# Patient Record
Sex: MALE | Race: WHITE | Employment: OTHER | URBAN - METROPOLITAN AREA
[De-identification: names, ages, dates, MRNs, and addresses within clinical notes are randomized per-mention and may not be internally consistent; named-entity substitution may affect disease eponyms.]

---

## 2017-03-29 ENCOUNTER — TELEPHONE (OUTPATIENT)
Dept: FAMILY MEDICINE CLINIC | Age: 82
End: 2017-03-29

## 2017-03-31 ENCOUNTER — OFFICE VISIT (OUTPATIENT)
Dept: FAMILY MEDICINE CLINIC | Age: 82
End: 2017-03-31

## 2017-03-31 ENCOUNTER — TELEPHONE (OUTPATIENT)
Dept: FAMILY MEDICINE CLINIC | Age: 82
End: 2017-03-31

## 2017-03-31 VITALS
HEIGHT: 68 IN | DIASTOLIC BLOOD PRESSURE: 68 MMHG | WEIGHT: 160 LBS | TEMPERATURE: 97.4 F | BODY MASS INDEX: 24.25 KG/M2 | HEART RATE: 64 BPM | SYSTOLIC BLOOD PRESSURE: 122 MMHG | RESPIRATION RATE: 18 BRPM

## 2017-03-31 DIAGNOSIS — H61.23 BILATERAL IMPACTED CERUMEN: ICD-10-CM

## 2017-03-31 DIAGNOSIS — H53.8 BLURRED VISION: Primary | ICD-10-CM

## 2017-03-31 PROCEDURE — 99212 OFFICE O/P EST SF 10 MIN: CPT | Performed by: INTERNAL MEDICINE

## 2017-03-31 ASSESSMENT — PATIENT HEALTH QUESTIONNAIRE - PHQ9
1. LITTLE INTEREST OR PLEASURE IN DOING THINGS: 0
SUM OF ALL RESPONSES TO PHQ QUESTIONS 1-9: 0
SUM OF ALL RESPONSES TO PHQ9 QUESTIONS 1 & 2: 0
2. FEELING DOWN, DEPRESSED OR HOPELESS: 0

## 2017-03-31 ASSESSMENT — ENCOUNTER SYMPTOMS
DIARRHEA: 0
SHORTNESS OF BREATH: 0
CONSTIPATION: 0
WHEEZING: 0

## 2018-05-03 ENCOUNTER — OFFICE VISIT (OUTPATIENT)
Dept: FAMILY MEDICINE CLINIC | Age: 83
End: 2018-05-03

## 2018-05-03 VITALS
BODY MASS INDEX: 24.02 KG/M2 | HEART RATE: 57 BPM | DIASTOLIC BLOOD PRESSURE: 60 MMHG | RESPIRATION RATE: 12 BRPM | WEIGHT: 158 LBS | SYSTOLIC BLOOD PRESSURE: 110 MMHG

## 2018-05-03 DIAGNOSIS — R94.31 ABNORMAL ELECTROCARDIOGRAM (ECG) (EKG): ICD-10-CM

## 2018-05-03 DIAGNOSIS — E78.00 HYPERCHOLESTEROLEMIA: ICD-10-CM

## 2018-05-03 DIAGNOSIS — Z82.49 FAMILY HISTORY OF ABDOMINAL AORTIC ANEURYSM (AAA): ICD-10-CM

## 2018-05-03 DIAGNOSIS — R10.13 EPIGASTRIC PAIN: Primary | ICD-10-CM

## 2018-05-03 PROCEDURE — 99214 OFFICE O/P EST MOD 30 MIN: CPT | Performed by: NURSE PRACTITIONER

## 2018-05-03 PROCEDURE — 93000 ELECTROCARDIOGRAM COMPLETE: CPT | Performed by: NURSE PRACTITIONER

## 2018-05-03 ASSESSMENT — PATIENT HEALTH QUESTIONNAIRE - PHQ9
SUM OF ALL RESPONSES TO PHQ9 QUESTIONS 1 & 2: 0
2. FEELING DOWN, DEPRESSED OR HOPELESS: 0
SUM OF ALL RESPONSES TO PHQ QUESTIONS 1-9: 0
1. LITTLE INTEREST OR PLEASURE IN DOING THINGS: 0

## 2018-05-03 ASSESSMENT — ENCOUNTER SYMPTOMS
NAUSEA: 0
SHORTNESS OF BREATH: 0
ABDOMINAL PAIN: 1
CHEST TIGHTNESS: 0
BLOOD IN STOOL: 0
DIARRHEA: 0
VOMITING: 0
CONSTIPATION: 0
COUGH: 0

## 2018-05-04 DIAGNOSIS — E78.00 HYPERCHOLESTEROLEMIA: ICD-10-CM

## 2018-05-04 DIAGNOSIS — R10.13 EPIGASTRIC PAIN: ICD-10-CM

## 2018-05-04 LAB
A/G RATIO: 2.3 (ref 1.1–2.2)
ALBUMIN SERPL-MCNC: 4.4 G/DL (ref 3.4–5)
ALP BLD-CCNC: 57 U/L (ref 40–129)
ALT SERPL-CCNC: 15 U/L (ref 10–40)
AMYLASE: 69 U/L (ref 25–115)
ANION GAP SERPL CALCULATED.3IONS-SCNC: 10 MMOL/L (ref 3–16)
AST SERPL-CCNC: 15 U/L (ref 15–37)
BASOPHILS ABSOLUTE: 0 K/UL (ref 0–0.2)
BASOPHILS RELATIVE PERCENT: 0.4 %
BILIRUB SERPL-MCNC: 0.7 MG/DL (ref 0–1)
BUN BLDV-MCNC: 15 MG/DL (ref 7–20)
CALCIUM SERPL-MCNC: 8.9 MG/DL (ref 8.3–10.6)
CHLORIDE BLD-SCNC: 107 MMOL/L (ref 99–110)
CHOLESTEROL, TOTAL: 192 MG/DL (ref 0–199)
CO2: 27 MMOL/L (ref 21–32)
CREAT SERPL-MCNC: 0.6 MG/DL (ref 0.8–1.3)
EOSINOPHILS ABSOLUTE: 0.1 K/UL (ref 0–0.6)
EOSINOPHILS RELATIVE PERCENT: 1.5 %
GFR AFRICAN AMERICAN: >60
GFR NON-AFRICAN AMERICAN: >60
GLOBULIN: 1.9 G/DL
GLUCOSE BLD-MCNC: 95 MG/DL (ref 70–99)
HCT VFR BLD CALC: 42.7 % (ref 40.5–52.5)
HDLC SERPL-MCNC: 69 MG/DL (ref 40–60)
HEMOGLOBIN: 14.7 G/DL (ref 13.5–17.5)
LDL CHOLESTEROL CALCULATED: 111 MG/DL
LIPASE: 20 U/L (ref 13–60)
LYMPHOCYTES ABSOLUTE: 1.9 K/UL (ref 1–5.1)
LYMPHOCYTES RELATIVE PERCENT: 36.8 %
MCH RBC QN AUTO: 32.4 PG (ref 26–34)
MCHC RBC AUTO-ENTMCNC: 34.4 G/DL (ref 31–36)
MCV RBC AUTO: 94.1 FL (ref 80–100)
MONOCYTES ABSOLUTE: 0.5 K/UL (ref 0–1.3)
MONOCYTES RELATIVE PERCENT: 10.6 %
NEUTROPHILS ABSOLUTE: 2.6 K/UL (ref 1.7–7.7)
NEUTROPHILS RELATIVE PERCENT: 50.7 %
PDW BLD-RTO: 14.2 % (ref 12.4–15.4)
PLATELET # BLD: 171 K/UL (ref 135–450)
PMV BLD AUTO: 8.9 FL (ref 5–10.5)
POTASSIUM SERPL-SCNC: 4.3 MMOL/L (ref 3.5–5.1)
RBC # BLD: 4.54 M/UL (ref 4.2–5.9)
SODIUM BLD-SCNC: 144 MMOL/L (ref 136–145)
TOTAL PROTEIN: 6.3 G/DL (ref 6.4–8.2)
TRIGL SERPL-MCNC: 59 MG/DL (ref 0–150)
VLDLC SERPL CALC-MCNC: 12 MG/DL
WBC # BLD: 5.1 K/UL (ref 4–11)

## 2018-05-07 ENCOUNTER — TELEPHONE (OUTPATIENT)
Dept: FAMILY MEDICINE CLINIC | Age: 83
End: 2018-05-07

## 2018-05-07 DIAGNOSIS — R10.13 EPIGASTRIC PAIN: ICD-10-CM

## 2018-05-07 DIAGNOSIS — Z82.49 FAMILY HISTORY OF ABDOMINAL AORTIC ANEURYSM (AAA): ICD-10-CM

## 2018-05-07 NOTE — TELEPHONE ENCOUNTER
[address] is calling to see if she can get an order placed for the pt to have a CT of the abdominal and pelvis with contrast.    Please advise    CB# [phone]    Please advise

## 2018-05-09 ENCOUNTER — HOSPITAL ENCOUNTER (OUTPATIENT)
Dept: NON INVASIVE DIAGNOSTICS | Age: 83
Discharge: HOME | End: 2018-05-09
Attending: NURSE PRACTITIONER | Admitting: NURSE PRACTITIONER

## 2018-05-09 ENCOUNTER — TELEPHONE (OUTPATIENT)
Dept: FAMILY MEDICINE CLINIC | Age: 83
End: 2018-05-09

## 2018-05-09 DIAGNOSIS — Z82.49 FAMILY HISTORY OF ABDOMINAL AORTIC ANEURYSM (AAA): ICD-10-CM

## 2018-05-09 DIAGNOSIS — R94.31 ABNORMAL ELECTROCARDIOGRAM: ICD-10-CM

## 2018-05-09 DIAGNOSIS — R10.13 EPIGASTRIC PAIN: ICD-10-CM

## 2018-05-09 PROBLEM — I20.0 UNSTABLE ANGINA (HCC): Status: ACTIVE | Noted: 2018-05-09

## 2018-05-09 LAB
LV EF: 58 %
LVEF MODALITY: NORMAL

## 2018-05-09 RX ADMIN — Medication 325 MG: at 12:04

## 2018-05-11 PROBLEM — I71.40 ABDOMINAL AORTIC ANEURYSM (AAA) WITHOUT RUPTURE: Status: ACTIVE | Noted: 2018-05-11

## 2018-05-21 ENCOUNTER — TELEPHONE (OUTPATIENT)
Dept: FAMILY MEDICINE CLINIC | Age: 83
End: 2018-05-21

## 2018-05-21 DIAGNOSIS — I71.40 ABDOMINAL AORTIC ANEURYSM (AAA) WITHOUT RUPTURE: Primary | ICD-10-CM

## 2018-05-23 ENCOUNTER — OFFICE VISIT (OUTPATIENT)
Dept: CARDIOTHORACIC SURGERY | Age: 83
End: 2018-05-23

## 2018-05-23 VITALS
WEIGHT: 153 LBS | BODY MASS INDEX: 23.26 KG/M2 | HEART RATE: 80 BPM | OXYGEN SATURATION: 99 % | TEMPERATURE: 98 F | DIASTOLIC BLOOD PRESSURE: 60 MMHG | SYSTOLIC BLOOD PRESSURE: 116 MMHG

## 2018-05-23 DIAGNOSIS — I71.40 ABDOMINAL AORTIC ANEURYSM (AAA) WITHOUT RUPTURE: ICD-10-CM

## 2018-05-23 DIAGNOSIS — I20.0 UNSTABLE ANGINA (HCC): ICD-10-CM

## 2018-05-23 DIAGNOSIS — Z95.1 S/P CABG X 2: Primary | ICD-10-CM

## 2018-05-23 PROCEDURE — 99024 POSTOP FOLLOW-UP VISIT: CPT | Performed by: THORACIC SURGERY (CARDIOTHORACIC VASCULAR SURGERY)

## 2018-06-04 ENCOUNTER — INITIAL CONSULT (OUTPATIENT)
Dept: SURGERY | Age: 83
End: 2018-06-04

## 2018-06-04 VITALS
BODY MASS INDEX: 23.27 KG/M2 | SYSTOLIC BLOOD PRESSURE: 138 MMHG | DIASTOLIC BLOOD PRESSURE: 71 MMHG | WEIGHT: 153.04 LBS | HEART RATE: 79 BPM

## 2018-06-04 DIAGNOSIS — I71.40 ABDOMINAL AORTIC ANEURYSM (AAA) WITHOUT RUPTURE: Primary | ICD-10-CM

## 2018-06-04 DIAGNOSIS — I10 HYPERTENSION, UNSPECIFIED TYPE: ICD-10-CM

## 2018-06-04 DIAGNOSIS — Z95.1 S/P CABG X 2: ICD-10-CM

## 2018-06-04 DIAGNOSIS — E78.00 HYPERCHOLESTEROLEMIA: ICD-10-CM

## 2018-06-04 DIAGNOSIS — Z82.49 FAMILY HISTORY OF ABDOMINAL AORTIC ANEURYSM (AAA): ICD-10-CM

## 2018-06-04 PROCEDURE — 99214 OFFICE O/P EST MOD 30 MIN: CPT | Performed by: NURSE PRACTITIONER

## 2018-06-04 ASSESSMENT — ENCOUNTER SYMPTOMS: ABDOMINAL PAIN: 0

## 2018-06-06 ENCOUNTER — TELEPHONE (OUTPATIENT)
Dept: FAMILY MEDICINE CLINIC | Age: 83
End: 2018-06-06

## 2018-06-13 ENCOUNTER — OFFICE VISIT (OUTPATIENT)
Dept: CARDIOLOGY CLINIC | Age: 83
End: 2018-06-13

## 2018-06-13 VITALS
HEART RATE: 75 BPM | BODY MASS INDEX: 23.04 KG/M2 | WEIGHT: 152 LBS | DIASTOLIC BLOOD PRESSURE: 62 MMHG | SYSTOLIC BLOOD PRESSURE: 104 MMHG | OXYGEN SATURATION: 98 % | HEIGHT: 68 IN

## 2018-06-13 DIAGNOSIS — I71.40 ABDOMINAL AORTIC ANEURYSM (AAA) WITHOUT RUPTURE: ICD-10-CM

## 2018-06-13 DIAGNOSIS — I10 ESSENTIAL HYPERTENSION: ICD-10-CM

## 2018-06-13 DIAGNOSIS — Z95.1 S/P CABG X 2: Primary | ICD-10-CM

## 2018-06-13 PROCEDURE — 93000 ELECTROCARDIOGRAM COMPLETE: CPT | Performed by: INTERNAL MEDICINE

## 2018-06-13 PROCEDURE — 99214 OFFICE O/P EST MOD 30 MIN: CPT | Performed by: INTERNAL MEDICINE

## 2018-06-13 RX ORDER — METOPROLOL SUCCINATE 25 MG/1
12.5 TABLET, EXTENDED RELEASE ORAL DAILY
Qty: 90 TABLET | Refills: 3 | Status: SHIPPED | OUTPATIENT
Start: 2018-06-13 | End: 2019-07-11 | Stop reason: SDUPTHER

## 2018-06-13 RX ORDER — ATORVASTATIN CALCIUM 20 MG/1
20 TABLET, FILM COATED ORAL NIGHTLY
Qty: 90 TABLET | Refills: 3 | Status: SHIPPED | OUTPATIENT
Start: 2018-06-13 | End: 2019-03-19 | Stop reason: SDUPTHER

## 2018-06-13 RX ORDER — CLOPIDOGREL BISULFATE 75 MG/1
75 TABLET ORAL DAILY
Qty: 90 TABLET | Refills: 3 | Status: SHIPPED | OUTPATIENT
Start: 2018-06-13 | End: 2019-03-28 | Stop reason: SDUPTHER

## 2018-06-19 ENCOUNTER — OFFICE VISIT (OUTPATIENT)
Dept: FAMILY MEDICINE CLINIC | Age: 83
End: 2018-06-19

## 2018-06-19 VITALS
HEIGHT: 68 IN | DIASTOLIC BLOOD PRESSURE: 78 MMHG | RESPIRATION RATE: 16 BRPM | SYSTOLIC BLOOD PRESSURE: 118 MMHG | BODY MASS INDEX: 22.73 KG/M2 | OXYGEN SATURATION: 96 % | WEIGHT: 150 LBS | HEART RATE: 76 BPM

## 2018-06-19 DIAGNOSIS — D64.9 ANEMIA, UNSPECIFIED TYPE: ICD-10-CM

## 2018-06-19 DIAGNOSIS — I10 HYPERTENSION, UNSPECIFIED TYPE: ICD-10-CM

## 2018-06-19 DIAGNOSIS — H61.22 IMPACTED CERUMEN OF LEFT EAR: ICD-10-CM

## 2018-06-19 DIAGNOSIS — K63.9 COLON WALL THICKENING: ICD-10-CM

## 2018-06-19 DIAGNOSIS — I71.40 ABDOMINAL AORTIC ANEURYSM (AAA) WITHOUT RUPTURE: Primary | ICD-10-CM

## 2018-06-19 DIAGNOSIS — R73.9 HYPERGLYCEMIA: ICD-10-CM

## 2018-06-19 DIAGNOSIS — Z95.1 S/P CABG X 2: ICD-10-CM

## 2018-06-19 DIAGNOSIS — E78.00 HYPERCHOLESTEROLEMIA: ICD-10-CM

## 2018-06-19 PROCEDURE — 99214 OFFICE O/P EST MOD 30 MIN: CPT | Performed by: INTERNAL MEDICINE

## 2018-06-19 ASSESSMENT — ENCOUNTER SYMPTOMS
CONSTIPATION: 0
SHORTNESS OF BREATH: 0
BLOOD IN STOOL: 0
WHEEZING: 0
ABDOMINAL PAIN: 0
DIARRHEA: 0

## 2018-06-19 ASSESSMENT — PATIENT HEALTH QUESTIONNAIRE - PHQ9
SUM OF ALL RESPONSES TO PHQ9 QUESTIONS 1 & 2: 0
SUM OF ALL RESPONSES TO PHQ QUESTIONS 1-9: 0
2. FEELING DOWN, DEPRESSED OR HOPELESS: 0
1. LITTLE INTEREST OR PLEASURE IN DOING THINGS: 0

## 2018-06-20 ENCOUNTER — OFFICE VISIT (OUTPATIENT)
Dept: CARDIOTHORACIC SURGERY | Age: 83
End: 2018-06-20

## 2018-06-20 VITALS
WEIGHT: 150 LBS | HEIGHT: 68 IN | OXYGEN SATURATION: 96 % | DIASTOLIC BLOOD PRESSURE: 76 MMHG | HEART RATE: 71 BPM | BODY MASS INDEX: 22.73 KG/M2 | TEMPERATURE: 97.5 F | SYSTOLIC BLOOD PRESSURE: 130 MMHG

## 2018-06-20 DIAGNOSIS — I71.40 ABDOMINAL AORTIC ANEURYSM (AAA) WITHOUT RUPTURE: ICD-10-CM

## 2018-06-20 DIAGNOSIS — I20.0 UNSTABLE ANGINA (HCC): Primary | ICD-10-CM

## 2018-06-20 DIAGNOSIS — Z95.1 S/P CABG X 2: ICD-10-CM

## 2018-06-20 PROCEDURE — 99024 POSTOP FOLLOW-UP VISIT: CPT | Performed by: THORACIC SURGERY (CARDIOTHORACIC VASCULAR SURGERY)

## 2018-06-25 ENCOUNTER — HOSPITAL ENCOUNTER (OUTPATIENT)
Dept: OTHER | Age: 83
Discharge: HOME | End: 2018-06-30
Attending: INTERNAL MEDICINE | Admitting: INTERNAL MEDICINE

## 2018-06-25 NOTE — PROGRESS NOTES
Ejection fraction -systolic function intact according to cath report, no ECHO results in record       Physical Assessment     General Appearance   Color: [x]  Natural [] Pale ( ) Cyanotic []  Flushed [] Jaundice   Skin Integrity: healing surgical incisions   Incision(s) where: midsternal and right lower leg surgical incision  Hx of infection at incision(s) site [x] No  [] Yes      Cardiovascular Assessment/ Vital Signs    Heart rate: 74  Heart sounds: nl S1S2  Height: 5'8\"  Weight: 154.3 lbs    Resp rate: 16  Lungs sounds: fairly CTA     Dyspnea  []  no  [x] yes -mainly on stairs      []  Sleep Apnea    []  CPAP     []  Oxygen       Sleeping Habits: averages 7-8 hours of sleep per night       # of Pillows: 1    # of times up at night: 1-2 times for restroom use    BP  R arm sittin/68   L arm sittin/64    Peripheral pulses  []  no [x] yes    Edema [x] no [] yes  Location:      Fatigue  [x] no  [] yes    Numbness/tingling [x]  no   []  yes       Orthopedic/Exercise Limitations: denies limitations    Pain Assessment   Rate on 1 to 10 scale      [x]  No complaints of pain at this time                    [] Angina/chest pain Rates:    Relief with:       []  Incisional Pain Rates:      Relief with:        []  Muscle / Joint / Arthritic    Rates:     Relief with:         []  Leg Pain     Rates:    Relief with:         []  Other        Fall Risk Assessment: Falls in the last 3 months  [] yes [x]  no     []  Alzheimers Disease [] Cognitive Impairment      [] Balance/Gait Disturbance  []  Fall History      []  Visual impairment uncorrected []  Dizziness []  Uses a cane or walker    []  Other     [x]  Fall safety issues reviewed    Physical/behavioral signs of abuse/neglect  [x] no    []  yes      Do you feel safe at home   []  no    [x]  yes    Advanced Directives        [] no   [x]  yes   []  Pt given Advanced Directive pack            Individual Cardiac Treatment Plan EXERCISE  EXERCISE  ASSESSMENT/PLAN NUTRITION PLAN NUTRITION PLAN NUTRITION PLAN   *Interventions* *Interventions* *Interventions* *Interventions* *Interventions*   Professional Referral  Please check if needed. [] Dietician Consult    [] Diabetes Referral Professional Referral   []  Seen by dietician for   Consult/referral   []  Diabetes referral  [] Seen by dietician for consult/ referral   []  Seen for Diabetes Referral   Has patient completed consult if needed? [] Y [] N Met with dietician   [] Y  [] N Met with diabetes educator   Nutrition Education Nutrition Education Nutrition Education Nutrition Education Nutrition Education    [] Individual Nutrition Counseling by staff/dietician    []  Individual Nutrition Counseling   []  Diabetic education if needed    Education Classes by dietician  1. []  Nutrients and Portion control  2. [] Fats & Fiber  3. []  Sodium, Dash & Mediterranean Diet               Education Classes by dietician  1. [] Nutrients & Portion Control  2. [] Fats & Fibers  3. []  Sodium, Dash and Mediterranean Diet           Education Classes by Dietician  1. [] Nutrients & Portion Control  2. [] Fat & Fibers  3. [] Sodium, Dash and Mediterranean Diet   Patient has knowledge of:   [] Y  [] N Heart Healthy diet   [] Y [] N Nutritional guidelines    [] Y  [] N Diabetic diet      Goals Goals Reassessed Goals Reassessed Goals Reassessed Goals   Initial Nutritional Goals Set (x)Yes  ()No Previous Nutritional Goals Met?  ()Yes  ()No Previous Nutritional Goals Met?  ()Yes  ()No Previous Nutritional Goals Met?  ()Yes  ()No Previous Nutritional Goals Met?  ()Yes  ()No   Ramos's nutritional goals are as follows: Individual goals     1. Increase understanding of the principles of the cardiac diet, how to follow a health eating pattern (currently relies on his wife to cook for him and to make healthier selections). Long term:  1. Improved Rate your plate score  2.  Improved glucose control Review goals/ Revised:             Review completed:   [] Attends Emotional wellness class   [] Voices method of coping/ relaxation technique      Goals    Goals*   Initial Psychosocial Goals Set [] Yes   [x] No    Previous Psychosocial Goals Met  [] Yes   [] No   Richard's psychosocial goals are as follows:    Declines the need to establish psychosocial goals    Improved PHQ9 score  Improved Mental Health  Score               [] Improved PHQ9 score   [] Improved Mental Health score     Individual Cardiac Treatment Plan  Other:  Risk Factor/Education  CORE MEASURE  ASSESSMENT/PLAN CORE MEASURE  REASSESSMENT  CORE MEASURE  REASSESSMENT CORE MEASURE  REASSESSMENT CORE MEASURE  DISCHARGE/FOLLOW-UP   CORE MEASURE ASSESSMENT [address]  Discharge   Hypertension   []Yes [x]No  Resting BP: 122/64  Peak Ex BP: 138/80  See medication list.   Hypertension    Resting BP:   Peak Ex BP:  Medication Changes:  []Yes []No   Hypertension    Resting BP:   Peak Ex BP:  Medication Changes:  []Yes []No     Hypertension    Resting BP:   Peak Ex BP:  Medication Changes:  []Yes []No    Hypertension    Resting BP:   Peak Ex BP:  Medication Changes:  []Yes []No     Tobacco Use  []Current [x]Former []Never    Years smoked: 20 years   Date Quit: 2006  Quit date set: []Yes []No  Date:   # cigarettes smoked/day: 1 ppd  Smokeless Tobacco use:   []Yes  []No  Amount: n/a Tobacco Use  Change in smoking status           Quit date:    Tobacco Use  Change in smoking status           Quit date:    Tobacco Use  Change in smoking status           Quit date:     Tobacco Use  Change in smoking status           Quit date:      Heart Health Knowledge   Test Score: 9/15        Heart Health Knowledge   Test Score:    /15        Learning Barriers  Please select one:  []Speech  []Literacy  [] Hearing  []Cognitive  [] Vision  [x]Ready to Learn Learning Barriers addressed:[] Y[] N  Modifications:           Other Core Measures EDUCATION PLAN Other Core Measures EDUCATION PLAN Other Core Measures EDUCATION PLAN Other Core Measures EDUCATION PLAN Other Core Measure EDUCATION PLAN   Interventions Interventions Interventions Interventions Interventions   Cardiac Risk Factor Education Needed      []HTN              [] Attended Education Class on Risk Factors for Heart Disease  [] Home B/P monitoring  [] Dietary Sodium Restriction      []Attended Education Class on Risk Factors for Heart Disease  [] Home B/P monitoring  [] Dietary Sodium Restriction          []Attended Education Class on Risk Factors for Heart Disease    []Home B/P monitoring  [] Dietary Sodium Restriction  Richard voices 1. Understanding of risks for heart disease and how to modify their risk. 2. Understanding of importance of restricting sodium in diet. []Y []N  Smoking Cessation counseling needed  []Y []N Pt verbalizes readiness to quit smoking?  []Y[] N Quit date set  []Y []N Cut down cigarettes   []One on one counseling on Tobacco Cessation  [] Attend Smoking Cessation classes    []Smoking Cessation Information given  []Smoking cessation medications used:   [] One on one counseling on Tobacco Cessation. [] Attend smoking cessation classes      []Smoking cessation medications used:   [] One on one counseling on Tobacco Cessation. [] Attend smoking cessation classes        []Smoking cessation medications used: Tobacco Cessation Counseling attended  []Yes  []No  If not completed, Why? Core Measure Education Core Measure Education Core Measure Education Core Measure Education Education   [x] Cardiac Rehab Education booklet given  [x] Cardiac Rehab education class list given Attended Education Classes:  1. []  A & P of the  Heart & Body  2. []Heart Disease  3. [] Risk Factors  4. []  Cardiac Medications  5. [] Cardiac Interventions Attended Education Classes:  1. []  A & P of the  Heart & Body  2. [] Heart Disease  3. []  Risk Factors  4.[] ()  Cardiac Medications  5. [] Cardiac Interventions Attended Education Classes:  1. [] A & P of the  Heart & Body  2. []Heart Disease  3. [] Risk Factors  4. [] Cardiac Medications  5. []Cardiac Interventions Attend all the education classes. *Goals* Goal Reassessment Goal Reassessment Goal Reassessment *Goals*   [place] Initial Risk factor/education  goals are as follows:    1. Develop consistent home exercise regimen. 2. Increase understanding of cardiac diet principles. 3. Improve shortness of breath (GLEASON). 4. Increase strength and endurance. Resting B/P < 140/90 or 130/80 if DM or CKD  [] Y [x]N Complete tobacco cessation  []Y [x]N Understanding risks of heart disease  []Y [x]N Heart failure self management     Goal Progress:    Goal Progress:     Goal Progress: Yi Snyder has met goals ()yes         Physician Response  [x]Initiate Individualized Treatment Plan (ITP)  []Other   Physician Response  [] Proceed with rehab and 30 day updates per ITP. []Other     Physician Response  [] Proceed with rehab and 30 day updates per ITP. []Other   Physician Response  [] Proceed with rehab and 30 day updates per ITP. []Other    Physician Final Signature     Comments: Initial evaluation complete. Please see above for program specific goals. Completed initial six minute walk test for 1040 feet, offering no complaints. Telemetry revealed a normal sinus rhythm. Will begin with first exercise session on Wednesday, 6/27/2018. Will progress as tolerated with exercise modalities. Will provide education and support towards risk factor modification. Monthly updates of progress will follow. Thank you. THERESA Eduardo MS, RDN, LD, RN 6/25/2018

## 2018-07-01 ENCOUNTER — HOSPITAL ENCOUNTER (OUTPATIENT)
Dept: OTHER | Age: 83
Discharge: HOME | End: 2018-07-31
Attending: INTERNAL MEDICINE | Admitting: INTERNAL MEDICINE

## 2018-08-01 ENCOUNTER — HOSPITAL ENCOUNTER (OUTPATIENT)
Dept: OTHER | Age: 83
Discharge: HOME | End: 2018-08-31
Attending: INTERNAL MEDICINE | Admitting: INTERNAL MEDICINE

## 2018-09-01 ENCOUNTER — HOSPITAL ENCOUNTER (OUTPATIENT)
Dept: OTHER | Age: 83
Discharge: HOME | End: 2018-09-01
Attending: INTERNAL MEDICINE | Admitting: INTERNAL MEDICINE

## 2018-09-05 DIAGNOSIS — K63.9 COLON WALL THICKENING: ICD-10-CM

## 2018-09-05 DIAGNOSIS — R19.5 POSITIVE FIT (FECAL IMMUNOCHEMICAL TEST): ICD-10-CM

## 2018-09-05 LAB
CONTROL: POSITIVE
HEMOCCULT STL QL: POSITIVE

## 2018-09-05 PROCEDURE — 82274 ASSAY TEST FOR BLOOD FECAL: CPT | Performed by: INTERNAL MEDICINE

## 2018-09-06 ENCOUNTER — TELEPHONE (OUTPATIENT)
Dept: FAMILY MEDICINE CLINIC | Age: 83
End: 2018-09-06

## 2018-09-13 ENCOUNTER — OFFICE VISIT (OUTPATIENT)
Dept: CARDIOLOGY CLINIC | Age: 83
End: 2018-09-13

## 2018-09-13 VITALS
HEIGHT: 68 IN | HEART RATE: 66 BPM | BODY MASS INDEX: 23.79 KG/M2 | SYSTOLIC BLOOD PRESSURE: 122 MMHG | OXYGEN SATURATION: 95 % | WEIGHT: 157 LBS | DIASTOLIC BLOOD PRESSURE: 66 MMHG

## 2018-09-13 DIAGNOSIS — I10 ESSENTIAL HYPERTENSION: ICD-10-CM

## 2018-09-13 DIAGNOSIS — Z95.1 HISTORY OF CORONARY ARTERY BYPASS GRAFT X 2: ICD-10-CM

## 2018-09-13 DIAGNOSIS — I25.10 CORONARY ARTERY DISEASE INVOLVING NATIVE CORONARY ARTERY OF NATIVE HEART WITHOUT ANGINA PECTORIS: Primary | ICD-10-CM

## 2018-09-13 DIAGNOSIS — I71.40 ABDOMINAL AORTIC ANEURYSM (AAA) WITHOUT RUPTURE: ICD-10-CM

## 2018-09-13 PROCEDURE — 99214 OFFICE O/P EST MOD 30 MIN: CPT | Performed by: INTERNAL MEDICINE

## 2018-09-13 NOTE — PROGRESS NOTES
[facility]  Cardiology Progress Note      Bella Kumar  1935, 81 y.o.      CC: No new complaints            Elie Stokes MD:      HPI:   This is a 81 y.o. male who came for a stress test to the hospital because of epigastric burning. The stress test was markedly abnormal.  He then underwent coronary angiography which showed critical ostial left main disease. He underwent emergency CABGx2 on 5/9/18 LIMA-LAD, reserve SVG-OM1, IABP. Extubated postop day one. Had transient heart block on telemetry monitoring with pacing wires working on demand. Patient is here for f/u. He is accompanied by his wife. He has no new complaints and says he is doing well. Wife wants to know if he can mow the lawn. He is asking about lifting, informed him to wait at least 6 months post-op. Taking all medication and tolerating. Today, denies any dyspnea, chest pain, palpitations and dizziness. Past Medical History:   Diagnosis Date    AAA (abdominal aortic aneurysm) ([facility] [address] 05/09/2018    3.8 cm    Coronary artery disease 05/09/2018    s/p CABG    Macular degeneration     Thrombophlebitis 2016    left lower extremity      Past Surgical History:   Procedure Laterality Date    CARDIAC CATHETERIZATION  05/09/2018    Dr. Michael Quinn. David - w/placement of IABP    CORONARY ARTERY BYPASS GRAFT  05/09/2018    Dr. Jaclyn Gonzalez - emergent x2 (LIMA-LAD, reverse R SVG-OM1), removal of IABP, placement of temporary pacing wire    PRE-MALIGNANT / BENIGN SKIN LESION EXCISION  11/11/2008    back     TRANSESOPHAGEAL ECHOCARDIOGRAM  05/09/2018    during emergent CABG      Family History   Problem Relation Age of Onset    Breast Cancer Sister     Other Brother         AAA      Social History   Substance Use Topics    Smoking status: Former Smoker     Packs/day: 1.00     Years: 50.00     Types: Cigarettes     Start date: 1958     Quit date: 1/1/2008    Smokeless tobacco: Never Used      Comment: counseled on tobacco exposure avoidance    Alcohol use 0.6 oz/week     1 Standard drinks or equivalent per week      Comment: 1 / night beer     No Known Allergies      Review of Systems -   Constitutional: Negative for weight gain/loss; malaise, fever  Respiratory: Negative for Asthma;  cough and hemoptysis  Cardiovascular: Negative for palpitations,dizziness   Gastrointestinal: Negative for abd.pain; constipation/diarrhea;    Genitourinary: Negative for stones; hematuria; frequency hesitancy  Integumentt: Negative for rash or pruritis  Hematologic/lymphatic: Negative for blood dyscrasia; leukemia/lymphoma  Musculoskeletal: Negative for Connective tissue disease  Neurological:  Negative for Seizure   Behavioral/Psych:Negative for Bipolar disorder, Schizophrenia; Dementia  Endocrine: negative for thyroid, parathyroid disease    Physical Examination:    /66 (Site: Left Upper Arm, Position: Sitting)   Pulse 66   Ht 5' 8\" (1.727 m)   Wt 157 lb (71.2 kg)   SpO2 95%   BMI 23.87 kg/m²    HEENT:  Face: Atraumatic, Conjunctiva: Pink; non icteric,  Mucous Memb:  Moist, No thyromegaly or Lymphadenopathy  Respiratory:  Resp Assessment: WNL, Resp Auscultation: Clear  Cardiovascular: Auscultation: nl S1 & S2, Palpation:  Nl PMI;  No heaves or thrills, JVP:  normal  Abdomen: Soft, non-tender, Normal bowel sounds,  No organomegaly  Extremities: No Cyanosis or Clubbing  Neurological: Oriented to time, place, and person, Non-anxious  Psychiatric: Normal mood and affect  Skin: Warm and dry,  No rash seen     Outpatient Prescriptions Marked as Taking for the 9/13/18 encounter (Office Visit) with Gianna Moscoso MD   Medication Sig Dispense Refill    atorvastatin (LIPITOR) 20 MG tablet Take 1 tablet by mouth nightly 90 tablet 3    metoprolol succinate (TOPROL XL) 25 MG extended release tablet Take 0.5 tablets by mouth daily 90 tablet 3    clopidogrel (PLAVIX) 75 MG tablet Take 1 tablet by mouth daily 90 tablet 3    aspirin 81 MG EC tablet Take 1 tablet by mouth daily 30 tablet 3       Labs:   Lab Results   Component Value Date    HDL 69 05/04/2018    HDL 57 01/10/2012    LDLCALC 111 05/04/2018    TRIG 59 05/04/2018       EKG: Reviewed     Chest X-Ray: 5/13/18     FINDINGS:   Jugular central venous introducer sheath remains in place on the right side. The heart size is stable.  Bibasilar volume loss persists.  Bilateral pleural   effusions persist.  No new airspace disease.           ECHO: none on file       Stress Test: 5/9/18  Summary    Treadmill MPI Results        1. Technically a satisfactory study.    2. Positive treadmill exercise stress portion of the study.    3. Uniform distribution with no perfusion defect in stress images. There is    Transient ischemic dilitation. This may represent severe ischemia.    4. Gated Study shows normal LV size and Systolic function; EF is 15%.    5. The patient exercised for 4.30 min. on the Clarke protocol to achieve a HR    of 110, which is 79 % of PMHR. The study was stopped due to exercise induced    shortness of breath. There was no chest pain . ECG showed ST elevation in    precordial leads and widespread ST depression during the recovery phase.    This took 12 min to return to normal.     Peak HR:110 bpm                                 HR/BP product:72193    Peak BP:151/72 mmHg                             Max exercise: 7 METS    Predicted HR: 139 bpm    % of predicted HR: 79    Test duration:4 min and 30 sec    Reason for termination:Physiologic Maximum        ECG Findings    Strongly positive stress test with ST elevation in precordial leads and    diffuse ST depressions       Corornary angiogram  & Intervention: 5/9/18  1.  The left main has ostial 95% lesion and the LAD and the circumflex appear to have no obstructive disease.  Only one picture was taken. 2.  The right coronary artery is a dominant vessel and is free of disease.     CABGX2: 5/9/18  LIMA-LAD, reserve SVG-OM1,    ASSESSMENT AND PLAN:      Mr. Frankey Kerbs is doing well post bypass in May. He is on optimal medical therapy. We'll check with cardiothoracic surgeons regarding longevity of Plavix use. CAD/CABGx2  LIMA-LAD, reserve SVG-OM. On ASA, Statin, BB and Plavix. Needs to check with CVTS to see if it is safe to stop Plavix. Finishing up cardiac rehab. Essential Hypertension  BP controlled today. Continue risk factor modification. Abdominal Aortic Aneurysm  Without rupture. 3.8 cm in diameter on CT 5/9/18. Follow up in 1 year. Thank you very much for allowing me to participate in the care of your patient. Please do not hesitate to contact me if you have any questions. Sincerely,    Ct Vasquez M.D  [address] [address], [facility] [address]  Ph: [phone]  Fax: [phone]    This note was scribed in the presence of Dr. Ct Vasquez MD by Chun Rubio    Physician Attestation:  The scribes documentation has been prepared under my direction and personally reviewed by me in its entirety. I confirm that the note above accurately reflects all work, treatment, procedures, and medical decision making performed by me.

## 2018-09-13 NOTE — PATIENT INSTRUCTIONS
least 30 minutes of exercise on most days of the week. Walking is a good choice. You also may want to do other activities, such as running, swimming, cycling, or playing tennis or team sports. · If you smoke, quit. It may be the best thing you can do to prevent heart disease. If you need help quitting, talk to your doctor about stop-smoking programs and medicines. These can increase your chances of quitting for good. · Stay at a healthy weight by balancing the calories you eat with your physical activity. · Limit alcohol to 2 drinks a day for men and 1 drink a day for women. · Manage stress. Stress can hurt your heart. Keep stress low by talking about your problems and feelings, rather than keeping your feelings hidden. Try different ways to reduce stress, such as exercise, deep breathing, meditation, or yoga. · Manage cholesterol, blood pressure, and diabetes, if you need to. Follow your doctor's instructions. Where can you learn more? Go to https://Zingfin.Draftster. org and sign in to your [employer] account. Enter C446 in the [employer] Insight box to learn more about \"Learning About How the Heart Works. \"     If you do not have an account, please click on the \"Sign Up Now\" link. Current as of: December 6, 2017  Content Version: 11.7  © 6787-7267 [employer]. Care instructions adapted under license by [facility] ([facility]). If you have questions about a medical condition or this instruction, always ask your healthcare professional. [place] [address] any warranty or liability for your use of this information.

## 2018-10-12 ENCOUNTER — ANESTHESIA EVENT (OUTPATIENT)
Dept: ENDOSCOPY | Age: 83
End: 2018-10-12
Payer: MEDICARE

## 2018-10-15 ENCOUNTER — ANESTHESIA (OUTPATIENT)
Dept: ENDOSCOPY | Age: 83
End: 2018-10-15
Payer: MEDICARE

## 2018-11-07 DIAGNOSIS — I10 HYPERTENSION, UNSPECIFIED TYPE: ICD-10-CM

## 2018-11-07 DIAGNOSIS — D64.9 ANEMIA, UNSPECIFIED TYPE: ICD-10-CM

## 2018-11-07 DIAGNOSIS — I71.40 ABDOMINAL AORTIC ANEURYSM (AAA) WITHOUT RUPTURE (HCC): ICD-10-CM

## 2018-11-07 DIAGNOSIS — K63.9 COLON WALL THICKENING: ICD-10-CM

## 2018-11-07 DIAGNOSIS — R73.9 HYPERGLYCEMIA: ICD-10-CM

## 2018-11-07 LAB
A/G RATIO: 2.3 (ref 1.1–2.2)
ALBUMIN SERPL-MCNC: 4.4 G/DL (ref 3.4–5)
ALP BLD-CCNC: 65 U/L (ref 40–129)
ALT SERPL-CCNC: 17 U/L (ref 10–40)
ANION GAP SERPL CALCULATED.3IONS-SCNC: 14 MMOL/L (ref 3–16)
AST SERPL-CCNC: 18 U/L (ref 15–37)
BASOPHILS ABSOLUTE: 0 K/UL (ref 0–0.2)
BASOPHILS RELATIVE PERCENT: 0.4 %
BILIRUB SERPL-MCNC: 0.5 MG/DL (ref 0–1)
BUN BLDV-MCNC: 18 MG/DL (ref 7–20)
CALCIUM SERPL-MCNC: 9.1 MG/DL (ref 8.3–10.6)
CHLORIDE BLD-SCNC: 107 MMOL/L (ref 99–110)
CO2: 24 MMOL/L (ref 21–32)
CREAT SERPL-MCNC: 0.7 MG/DL (ref 0.8–1.3)
EOSINOPHILS ABSOLUTE: 0.1 K/UL (ref 0–0.6)
EOSINOPHILS RELATIVE PERCENT: 1.3 %
GFR AFRICAN AMERICAN: >60
GFR NON-AFRICAN AMERICAN: >60
GLOBULIN: 1.9 G/DL
GLUCOSE BLD-MCNC: 99 MG/DL (ref 70–99)
HCT VFR BLD CALC: 43 % (ref 40.5–52.5)
HEMOGLOBIN: 14.4 G/DL (ref 13.5–17.5)
LYMPHOCYTES ABSOLUTE: 1.3 K/UL (ref 1–5.1)
LYMPHOCYTES RELATIVE PERCENT: 26.3 %
MCH RBC QN AUTO: 30.8 PG (ref 26–34)
MCHC RBC AUTO-ENTMCNC: 33.5 G/DL (ref 31–36)
MCV RBC AUTO: 92 FL (ref 80–100)
MONOCYTES ABSOLUTE: 0.5 K/UL (ref 0–1.3)
MONOCYTES RELATIVE PERCENT: 11.2 %
NEUTROPHILS ABSOLUTE: 3 K/UL (ref 1.7–7.7)
NEUTROPHILS RELATIVE PERCENT: 60.8 %
PDW BLD-RTO: 15.5 % (ref 12.4–15.4)
PLATELET # BLD: 178 K/UL (ref 135–450)
PMV BLD AUTO: 8.5 FL (ref 5–10.5)
POTASSIUM SERPL-SCNC: 4.7 MMOL/L (ref 3.5–5.1)
RBC # BLD: 4.67 M/UL (ref 4.2–5.9)
SODIUM BLD-SCNC: 145 MMOL/L (ref 136–145)
TOTAL PROTEIN: 6.3 G/DL (ref 6.4–8.2)
WBC # BLD: 4.9 K/UL (ref 4–11)

## 2018-11-08 DIAGNOSIS — Z95.1 S/P CABG X 2: Primary | ICD-10-CM

## 2018-11-08 PROBLEM — I25.10 CAD (CORONARY ARTERY DISEASE): Status: ACTIVE | Noted: 2018-11-08

## 2018-11-08 LAB
ESTIMATED AVERAGE GLUCOSE: 116.9 MG/DL
HBA1C MFR BLD: 5.7 %

## 2018-11-08 NOTE — PROGRESS NOTES
[address] [facility] time__0830__________        Surgery time_1000___________    Take the following medications with a sip of water: METOPROLOL    Do not eat or drink anything after 12:00 midnight prior to your surgery. This includes water chewing gum, mints and ice chips. You may brush your teeth and gargle the morning of your surgery, but do not swallow the water     Please see your family doctor/pediatrician for a history and physical and/or concerning medications. Bring any test results/reports from your physicians office. If you are under the care of a heart doctor or specialist doctor, please be aware that you may be asked to them for clearance    You may be asked to stop blood thinners such as Coumadin, Plavix, Fragmin, Lovenox, etc., or any anti-inflammatories such as:  Aspirin, Ibuprofen, Advil, Naproxen prior to your surgery. We also ask that you stop any OTC medications such as fish oil, vitamin E, glucosamine, garlic, Multivitamins, COQ 10, etc.MAY CONTINUE ASPIRIN FOR PROCEDURE-MAY TAKE TYLENOL    We ask that you do not smoke 24 hours prior to surgery  We ask that you do not  drink any alcoholic beverages 24 hours prior to surgery     You must make arrangements for a responsible adult to take you home after your surgery. For your safety you will not be allowed to leave alone or drive yourself home. Your surgery will be cancelled if you do not have a ride home. Also for your safety, it is strongly suggested that someone stay with you the first 24 hours after your surgery. A parent or legal guardian must accompany a child scheduled for surgery and plan to stay at the hospital until the child is discharged. Please do not bring other children with you. For your comfort, please wear simple loose fitting clothing to the hospital.  Please do not bring valuables.     Do not wear any make-up or nail polish on your fingers or toes      For your

## 2018-11-12 ENCOUNTER — HOSPITAL ENCOUNTER (OUTPATIENT)
Age: 83
Setting detail: OUTPATIENT SURGERY
Discharge: HOME | End: 2018-11-12
Attending: INTERNAL MEDICINE | Admitting: INTERNAL MEDICINE
Payer: MEDICARE

## 2018-11-12 VITALS — DIASTOLIC BLOOD PRESSURE: 54 MMHG | SYSTOLIC BLOOD PRESSURE: 85 MMHG | OXYGEN SATURATION: 100 %

## 2018-11-12 VITALS
SYSTOLIC BLOOD PRESSURE: 147 MMHG | BODY MASS INDEX: 23.72 KG/M2 | HEART RATE: 66 BPM | OXYGEN SATURATION: 95 % | HEIGHT: 68 IN | RESPIRATION RATE: 18 BRPM | WEIGHT: 156.53 LBS | DIASTOLIC BLOOD PRESSURE: 43 MMHG | TEMPERATURE: 97 F

## 2018-11-12 DIAGNOSIS — R19.5 POSITIVE FECAL IMMUNOCHEMICAL TEST: ICD-10-CM

## 2018-11-12 PROCEDURE — 3609009900 HC COLONOSCOPY W/CONTROL BLEEDING ANY METHOD: Performed by: INTERNAL MEDICINE

## 2018-11-12 PROCEDURE — 7100000011 HC PHASE II RECOVERY - ADDTL 15 MIN: Performed by: INTERNAL MEDICINE

## 2018-11-12 PROCEDURE — 3609010600 HC COLONOSCOPY POLYPECTOMY SNARE/COLD BIOPSY: Performed by: INTERNAL MEDICINE

## 2018-11-12 PROCEDURE — 6360000002 HC RX W HCPCS: Performed by: NURSE ANESTHETIST, CERTIFIED REGISTERED

## 2018-11-12 PROCEDURE — 2720000010 HC SURG SUPPLY STERILE: Performed by: INTERNAL MEDICINE

## 2018-11-12 PROCEDURE — 2500000003 HC RX 250 WO HCPCS: Performed by: NURSE ANESTHETIST, CERTIFIED REGISTERED

## 2018-11-12 PROCEDURE — 2709999900 HC NON-CHARGEABLE SUPPLY: Performed by: INTERNAL MEDICINE

## 2018-11-12 PROCEDURE — 7100000010 HC PHASE II RECOVERY - FIRST 15 MIN: Performed by: INTERNAL MEDICINE

## 2018-11-12 PROCEDURE — 7100000001 HC PACU RECOVERY - ADDTL 15 MIN: Performed by: INTERNAL MEDICINE

## 2018-11-12 PROCEDURE — 3700000000 HC ANESTHESIA ATTENDED CARE: Performed by: INTERNAL MEDICINE

## 2018-11-12 PROCEDURE — 2580000003 HC RX 258: Performed by: ANESTHESIOLOGY

## 2018-11-12 PROCEDURE — 93005 ELECTROCARDIOGRAM TRACING: CPT | Performed by: ANESTHESIOLOGY

## 2018-11-12 PROCEDURE — 7100000000 HC PACU RECOVERY - FIRST 15 MIN: Performed by: INTERNAL MEDICINE

## 2018-11-12 PROCEDURE — 88305 TISSUE EXAM BY PATHOLOGIST: CPT

## 2018-11-12 PROCEDURE — 3700000001 HC ADD 15 MINUTES (ANESTHESIA): Performed by: INTERNAL MEDICINE

## 2018-11-12 PROCEDURE — 93010 ELECTROCARDIOGRAM REPORT: CPT | Performed by: INTERNAL MEDICINE

## 2018-11-12 RX ORDER — SODIUM CHLORIDE 0.9 % (FLUSH) 0.9 %
10 SYRINGE (ML) INJECTION EVERY 12 HOURS SCHEDULED
Status: DISCONTINUED | OUTPATIENT
Start: 2018-11-12 | End: 2018-11-12 | Stop reason: SDUPTHER

## 2018-11-12 RX ORDER — SODIUM CHLORIDE 0.9 % (FLUSH) 0.9 %
10 SYRINGE (ML) INJECTION PRN
Status: DISCONTINUED | OUTPATIENT
Start: 2018-11-12 | End: 2018-11-12 | Stop reason: SDUPTHER

## 2018-11-12 RX ORDER — LIDOCAINE HYDROCHLORIDE 20 MG/ML
INJECTION, SOLUTION EPIDURAL; INFILTRATION; INTRACAUDAL; PERINEURAL PRN
Status: DISCONTINUED | OUTPATIENT
Start: 2018-11-12 | End: 2018-11-12 | Stop reason: SDUPTHER

## 2018-11-12 RX ORDER — ONDANSETRON 2 MG/ML
4 INJECTION INTRAMUSCULAR; INTRAVENOUS
Status: DISCONTINUED | OUTPATIENT
Start: 2018-11-12 | End: 2018-11-12 | Stop reason: HOSPADM

## 2018-11-12 RX ORDER — LABETALOL HYDROCHLORIDE 5 MG/ML
5 INJECTION, SOLUTION INTRAVENOUS EVERY 10 MIN PRN
Status: DISCONTINUED | OUTPATIENT
Start: 2018-11-12 | End: 2018-11-12 | Stop reason: HOSPADM

## 2018-11-12 RX ORDER — SODIUM CHLORIDE 0.9 % (FLUSH) 0.9 %
10 SYRINGE (ML) INJECTION EVERY 12 HOURS SCHEDULED
Status: DISCONTINUED | OUTPATIENT
Start: 2018-11-12 | End: 2018-11-12 | Stop reason: HOSPADM

## 2018-11-12 RX ORDER — PROPOFOL 10 MG/ML
INJECTION, EMULSION INTRAVENOUS CONTINUOUS PRN
Status: DISCONTINUED | OUTPATIENT
Start: 2018-11-12 | End: 2018-11-12 | Stop reason: SDUPTHER

## 2018-11-12 RX ORDER — SODIUM CHLORIDE 9 MG/ML
INJECTION, SOLUTION INTRAVENOUS CONTINUOUS
Status: DISCONTINUED | OUTPATIENT
Start: 2018-11-12 | End: 2018-11-12 | Stop reason: SDUPTHER

## 2018-11-12 RX ORDER — PROMETHAZINE HYDROCHLORIDE 25 MG/ML
6.25 INJECTION, SOLUTION INTRAMUSCULAR; INTRAVENOUS
Status: DISCONTINUED | OUTPATIENT
Start: 2018-11-12 | End: 2018-11-12 | Stop reason: HOSPADM

## 2018-11-12 RX ORDER — SODIUM CHLORIDE 9 MG/ML
INJECTION, SOLUTION INTRAVENOUS CONTINUOUS
Status: DISCONTINUED | OUTPATIENT
Start: 2018-11-12 | End: 2018-11-12 | Stop reason: HOSPADM

## 2018-11-12 RX ORDER — SODIUM CHLORIDE 0.9 % (FLUSH) 0.9 %
10 SYRINGE (ML) INJECTION PRN
Status: DISCONTINUED | OUTPATIENT
Start: 2018-11-12 | End: 2018-11-12 | Stop reason: HOSPADM

## 2018-11-12 RX ADMIN — PROPOFOL 300 MCG/KG/MIN: 10 INJECTION, EMULSION INTRAVENOUS at 10:20

## 2018-11-12 RX ADMIN — SODIUM CHLORIDE: 9 INJECTION, SOLUTION INTRAVENOUS at 09:26

## 2018-11-12 RX ADMIN — LIDOCAINE HYDROCHLORIDE 60 MG: 20 INJECTION, SOLUTION EPIDURAL; INFILTRATION; INTRACAUDAL; PERINEURAL at 10:20

## 2018-11-12 ASSESSMENT — PULMONARY FUNCTION TESTS
PIF_VALUE: 0
PIF_VALUE: 1
PIF_VALUE: 0
PIF_VALUE: 2
PIF_VALUE: 0
PIF_VALUE: 1
PIF_VALUE: 0

## 2018-11-12 ASSESSMENT — PAIN SCALES - GENERAL
PAINLEVEL_OUTOF10: 0

## 2018-11-12 ASSESSMENT — PAIN DESCRIPTION - PAIN TYPE: TYPE: SURGICAL PAIN

## 2018-11-12 ASSESSMENT — PAIN - FUNCTIONAL ASSESSMENT: PAIN_FUNCTIONAL_ASSESSMENT: 0-10

## 2018-11-12 NOTE — OP NOTE
Colonoscopy Procedure Note      Patient: Shereen Carreon  : 1935  Acct#:     Procedure: Colonoscopy with polypectomy (cold snare), polypectomy (snare cautery)    Date:  2018    Surgeon:  Gabriel Adam MD    Referring Physician:  Gwen Phan MD    Previous Colonoscopy: NO  Date: N/A  Greater than 3 years: N/A    Preoperative Diagnosis:  1. Occult Positive Stools     Postoperative Diagnosis:  1. Transverse Colon Polyps 2 Descending Colon Polyp 3. Moderate left colon diverticulosis 4. Internal hemorrhoids. Consent:  The patient or their legal guardian has signed a consent, and is aware of the potential risks, benefits, alternatives, and potential complications of this procedure. These include, but are not limited to hemorrhage, bleeding, post procedural pain, perforation, phlebitis, aspiration, hypotension, hypoxia, cardiovascular events such as arryhthmia, and possibly death. Additionally, the possibility of missed colonic polyps and interval colon cancer was discussed in the consent. Anesthesia: The patient was administered IV propofol per anesthesiology team.  Please see their operative records for full details. Procedure: An informed consent was obtained from the patient after explanation of indications, benefits, possible risks and complications of the procedure. The patient was then taken to the endoscopy suite, placed in the left lateral decubitus position, and the above IV anesthesia was administered. A digital rectal examination was performed and revealed negative without mass, lesions or tenderness. The Olympus video colonoscope was placed in the patient's rectum under digital direction and advanced to the cecum. The cecum was identified by characteristic anatomy and ballottment. The preparation was excellent. The ileocecal valve was identified.      The scope was then withdrawn back through the cecum, ascending, transverse, descending, sigmoid colon, and rectum. Careful circumferential examination of the mucosa in these areas demonstrated:    1. A 10 mm polyp in the transverse colon was removed completely with snare cautery polypectomy. A hemoclip was placed to reduce risk of post-polypectomy bleeding. 2. A 3 mm polyp in the transverse colon removed completely with cold snare polypectomy. 3. A 5 mm polyp in the descending colon removed completely with cold snare polypectomy. 4. Multiple small and large mouthed diverticulum in the right colon consistent with moderate left colon diverticulosis. The scope was then withdrawn into the rectum and retroflexed. The retroflexed view of the anal verge and rectum demonstrates internal hemorrhoids. The scope was straightened, the colon was decompressed and the scope was withdrawn from the patient. The patient tolerated the procedure well and was taken to the PACU in good condition. Estimated blood loss: < 5 CC. Impression:  See post-procedure diagnoses. Recommendations:  1. Await pathology results. 2. I would not recommend any further colonoscopies for screening purposes based on age. 3. Recommend restarting Plavix in 24 hours. Instructed to contact me immediately if any signs of post-polypectomy bleeding. 4. The patient had biopsies taken today. The patient should call for results in 7 days if they have not heard from our office. Our number is [phone].     [address]  11/12/2018  [phone]

## 2018-11-12 NOTE — ANESTHESIA PRE PROCEDURE
encounter. Current Outpatient Prescriptions on File Prior to Encounter   Medication Sig Dispense Refill    atorvastatin (LIPITOR) 20 MG tablet Take 1 tablet by mouth nightly 90 tablet 3    metoprolol succinate (TOPROL XL) 25 MG extended release tablet Take 0.5 tablets by mouth daily 90 tablet 3    clopidogrel (PLAVIX) 75 MG tablet Take 1 tablet by mouth daily 90 tablet 3    aspirin 81 MG EC tablet Take 1 tablet by mouth daily 30 tablet 3     Current Facility-Administered Medications   Medication Dose Route Frequency Provider Last Rate Last Dose    0.9 % sodium chloride infusion   Intravenous Continuous Beverly Olivares MD        sodium chloride flush 0.9 % injection 10 mL  10 mL Intravenous 2 times per day Beverly Olivares MD        sodium chloride flush 0.9 % injection 10 mL  10 mL Intravenous PRN Beverly Olivares MD         Vital Signs (Current)   Vitals:    18   BP: (!) 154/66   Pulse: 65   Resp: 14   SpO2: 96%     Vital Signs Statistics (for past 48 hrs)     Pulse  Av  Min: 72   Min taken time: 18  Max: 72   Max taken time: 18  Resp  Av  Min: 14   Min taken time: 18  Max: 14   Max taken time: 18  BP  Min: 154/66   Min taken time: 18  Max: 154/66   Max taken time: 18  SpO2  Av %  Min: 96 %   Min taken time: 18  Max: 96 %   Max taken time: 18    BP Readings from Last 3 Encounters:   18 (!) 154/66   18 122/66   18 130/76     BMI  Body mass index is 23.8 kg/m². Estimated body mass index is 23.8 kg/m² as calculated from the following:    Height as of this encounter: 5' 8\" (1.727 m). Weight as of this encounter: 156 lb 8.4 oz (71 kg).     CBC   Lab Results   Component Value Date    WBC 4.9 2018    RBC 4.67 2018    HGB 14.4 2018    HCT 43.0 2018    MCV 92.0 2018    RDW 15.5 2018     2018     CMP    Lab Results   Component Value Date  11/07/2018    K 4.7 11/07/2018     11/07/2018    CO2 24 11/07/2018    BUN 18 11/07/2018    CREATININE 0.7 11/07/2018    GFRAA >60 11/07/2018    GFRAA >60 01/10/2012    AGRATIO 2.3 11/07/2018    LABGLOM >60 11/07/2018    GLUCOSE 99 11/07/2018    PROT 6.3 11/07/2018    PROT 6.6 01/10/2012    CALCIUM 9.1 11/07/2018    BILITOT 0.5 11/07/2018    ALKPHOS 65 11/07/2018    AST 18 11/07/2018    ALT 17 11/07/2018     BMP    Lab Results   Component Value Date     11/07/2018    K 4.7 11/07/2018     11/07/2018    CO2 24 11/07/2018    BUN 18 11/07/2018    CREATININE 0.7 11/07/2018    CALCIUM 9.1 11/07/2018    GFRAA >60 11/07/2018    GFRAA >60 01/10/2012    LABGLOM >60 11/07/2018    GLUCOSE 99 11/07/2018     POCGlucose  No results for input(s): GLUCOSE in the last 72 hours.    Coags    Lab Results   Component Value Date    PROTIME 13.2 05/16/2018    INR 1.17 61/90/2123     HCG (If Applicable) No results found for: PREGTESTUR, PREGSERUM, HCG, HCGQUANT   ABGs   Lab Results   Component Value Date    PHART 7.444 05/11/2018    PO2ART 81.9 05/11/2018    RPV0SBT 31.9 05/11/2018    JTX2RYC 21.5 05/11/2018    BEART -1.6 05/11/2018    W4EXEDRN 97.0 05/11/2018      Type & Screen (If Applicable)  No results found for: LABABO, LABRH                         BMI: Wt Readings from Last 3 Encounters:       NPO Status:8 hours                          Anesthesia Evaluation  Patient summary reviewed no history of anesthetic complications:   Airway: Mallampati: III  TM distance: >3 FB   Neck ROM: full   Dental:    (+) partials      Pulmonary:Negative Pulmonary ROS and normal exam                               Cardiovascular:  Exercise tolerance: poor (<4 METS),   (+) hypertension:, angina:, CAD:, CABG/stent (May 2018):, dysrhythmias (RBBB):,       ECG reviewed  Rhythm: regular  Rate: normal                    Neuro/Psych:   Negative Neuro/Psych ROS              GI/Hepatic/Renal: Neg GI/Hepatic/Renal ROS            Endo/Other: (+) blood dyscrasia (Plavix one week ago)::., .                 Abdominal:           Vascular:   + PVD, aortic or cerebral (AAA), . Anesthesia Plan      MAC     ASA 3       Induction: intravenous. Anesthetic plan and risks discussed with patient and spouse. Plan discussed with CRNA. This pre-anesthesia assessment may be used as a history and physical.    DOS STAFF ADDENDUM:    Pt seen and examined, chart reviewed (including anesthesia, drug and allergy history). No interval changes to history and physical examination. Anesthetic plan, risks, benefits, alternatives, and personnel involved discussed with patient. Patient verbalized an understanding and agrees to proceed.       Claudia Olguin MD  November 12, 2018  9:16 AM

## 2018-11-14 LAB
EKG ATRIAL RATE: 65 BPM
EKG DIAGNOSIS: NORMAL
EKG P AXIS: 10 DEGREES
EKG P-R INTERVAL: 200 MS
EKG Q-T INTERVAL: 460 MS
EKG QRS DURATION: 184 MS
EKG QTC CALCULATION (BAZETT): 478 MS
EKG R AXIS: -73 DEGREES
EKG T AXIS: 50 DEGREES
EKG VENTRICULAR RATE: 65 BPM

## 2018-11-15 PROBLEM — Z98.890 S/P COLONOSCOPY: Status: ACTIVE | Noted: 2018-11-15

## 2018-11-23 ENCOUNTER — HOSPITAL ENCOUNTER (EMERGENCY)
Age: 83
Discharge: HOME | End: 2018-11-23
Attending: EMERGENCY MEDICINE
Payer: MEDICARE

## 2018-11-23 VITALS
HEART RATE: 98 BPM | TEMPERATURE: 98 F | OXYGEN SATURATION: 97 % | RESPIRATION RATE: 18 BRPM | SYSTOLIC BLOOD PRESSURE: 126 MMHG | DIASTOLIC BLOOD PRESSURE: 80 MMHG | WEIGHT: 162.48 LBS | BODY MASS INDEX: 24.7 KG/M2

## 2018-11-23 DIAGNOSIS — K62.5 RECTAL BLEEDING: Primary | ICD-10-CM

## 2018-11-23 LAB
ANION GAP SERPL CALCULATED.3IONS-SCNC: 9 MMOL/L (ref 3–16)
APTT: 34.2 SEC (ref 26–36)
BASOPHILS ABSOLUTE: 0 K/UL (ref 0–0.2)
BASOPHILS RELATIVE PERCENT: 0.4 %
BUN BLDV-MCNC: 15 MG/DL (ref 7–20)
CALCIUM SERPL-MCNC: 8.9 MG/DL (ref 8.3–10.6)
CHLORIDE BLD-SCNC: 110 MMOL/L (ref 99–110)
CO2: 26 MMOL/L (ref 21–32)
CREAT SERPL-MCNC: 0.7 MG/DL (ref 0.8–1.3)
EOSINOPHILS ABSOLUTE: 0 K/UL (ref 0–0.6)
EOSINOPHILS RELATIVE PERCENT: 0.4 %
GFR AFRICAN AMERICAN: >60
GFR NON-AFRICAN AMERICAN: >60
GLUCOSE BLD-MCNC: 107 MG/DL (ref 70–99)
HCT VFR BLD CALC: 39 % (ref 40.5–52.5)
HEMOGLOBIN: 13.1 G/DL (ref 13.5–17.5)
INR BLD: 1.06 (ref 0.86–1.14)
LYMPHOCYTES ABSOLUTE: 1.2 K/UL (ref 1–5.1)
LYMPHOCYTES RELATIVE PERCENT: 22.8 %
MCH RBC QN AUTO: 30.8 PG (ref 26–34)
MCHC RBC AUTO-ENTMCNC: 33.7 G/DL (ref 31–36)
MCV RBC AUTO: 91.4 FL (ref 80–100)
MONOCYTES ABSOLUTE: 0.4 K/UL (ref 0–1.3)
MONOCYTES RELATIVE PERCENT: 8.1 %
NEUTROPHILS ABSOLUTE: 3.6 K/UL (ref 1.7–7.7)
NEUTROPHILS RELATIVE PERCENT: 68.3 %
PDW BLD-RTO: 15.3 % (ref 12.4–15.4)
PLATELET # BLD: 179 K/UL (ref 135–450)
PMV BLD AUTO: 8.4 FL (ref 5–10.5)
POTASSIUM REFLEX MAGNESIUM: 4.7 MMOL/L (ref 3.5–5.1)
PROTHROMBIN TIME: 12.1 SEC (ref 9.8–13)
RBC # BLD: 4.26 M/UL (ref 4.2–5.9)
SODIUM BLD-SCNC: 145 MMOL/L (ref 136–145)
WBC # BLD: 5.2 K/UL (ref 4–11)

## 2018-11-23 PROCEDURE — 85025 COMPLETE CBC W/AUTO DIFF WBC: CPT

## 2018-11-23 PROCEDURE — 99283 EMERGENCY DEPT VISIT LOW MDM: CPT

## 2018-11-23 PROCEDURE — 85610 PROTHROMBIN TIME: CPT

## 2018-11-23 PROCEDURE — 80048 BASIC METABOLIC PNL TOTAL CA: CPT

## 2018-11-23 PROCEDURE — 85730 THROMBOPLASTIN TIME PARTIAL: CPT

## 2018-11-23 ASSESSMENT — ENCOUNTER SYMPTOMS
ABDOMINAL DISTENTION: 0
EYE PAIN: 0
WHEEZING: 0
ABDOMINAL PAIN: 0
CONSTIPATION: 0
BLOOD IN STOOL: 1
CHEST TIGHTNESS: 0
COLOR CHANGE: 0
STRIDOR: 0
ANAL BLEEDING: 0
NAUSEA: 0
APNEA: 0
RECTAL PAIN: 0
VOMITING: 0
COUGH: 0
EYE DISCHARGE: 0
DIARRHEA: 0
PHOTOPHOBIA: 0
EYE REDNESS: 0
SHORTNESS OF BREATH: 0
EYE ITCHING: 0
CHOKING: 0
BACK PAIN: 0

## 2018-11-23 ASSESSMENT — PAIN SCALES - GENERAL: PAINLEVEL_OUTOF10: 0

## 2018-11-23 NOTE — ED PROVIDER NOTES
[facility]  eMERGENCY dEPARTMENT eNCOUnter      Pt Name: Yamile Armijo  MRN: [id number]  [place] 1935  Date of evaluation: 11/23/2018  Provider: Eleanor Winter MD    [address] [place] [address]       Chief Complaint   Patient presents with    Rectal Bleeding     Colonoscopy 11/12/18, clip device placed. Rectal bleeding started yesterday. 3 episodes yesterday, 3 episodes today. dark red blood with some red blood as well. only bleeding with BMs. no abd pain. no vomiting. HISTORY OF PRESENT ILLNESS    Yamile Armijo is a 81 y.o. male who presents to the emergency department with rectal bleeding. Snare polypectomy 3 polyps and 1 hemoclip placed on 11/12/18 by Dr Lindsey Gr. One day history 3-5 episodes of blood in stool. BRBPR. No abdominal pain. No other associated symptoms. Called GI office told to come here. Nursing Notes were reviewed. REVIEW OF SYSTEMS       Review of Systems   Constitutional: Negative for activity change, appetite change, chills, diaphoresis, fatigue, fever and unexpected weight change. HENT: Negative for congestion, dental problem, drooling, ear discharge and ear pain. Eyes: Negative for photophobia, pain, discharge, redness, itching and visual disturbance. Respiratory: Negative for apnea, cough, choking, chest tightness, shortness of breath, wheezing and stridor. Cardiovascular: Negative for chest pain, palpitations and leg swelling. Gastrointestinal: Positive for blood in stool. Negative for abdominal distention, abdominal pain, anal bleeding, constipation, diarrhea, nausea, rectal pain and vomiting. Endocrine: Negative for cold intolerance and heat intolerance. Genitourinary: Negative for decreased urine volume and urgency. Musculoskeletal: Negative for arthralgias and back pain. Skin: Negative for color change and pallor. Neurological: Negative for dizziness and facial asymmetry. Hematological: Negative for adenopathy.  Does not bruise/bleed easily. Psychiatric/Behavioral: Negative for agitation, behavioral problems, confusion and decreased concentration. Except as noted above the remainder of the review of systems was reviewed and negative. PAST MEDICAL HISTORY     Past Medical History:   Diagnosis Date    AAA (abdominal aortic aneurysm) ([facility] [address] 05/09/2018    3.8 cm    Coronary artery disease 05/09/2018    s/p CABG    Macular degeneration     Thrombophlebitis 2016    left lower extremity       SURGICAL HISTORY       Past Surgical History:   Procedure Laterality Date    CARDIAC CATHETERIZATION  05/09/2018    Dr. Janki Mchugh. David - w/placement of IABP    COLONOSCOPY  11/2018    Dr. Umair Garnica N/A 11/12/2018    COLONOSCOPY POLYPECTOMY SNARE/COLD BIOPSY performed by Kennedy Alvarez MD at [facility] 11/12/2018    COLONOSCOPY CONTROL HEMORRHAGE performed by Kennedy Alvarez MD at [facility]  05/09/2018    Dr. Harjeet Pierce - emergent x2 (LIMA-LAD, reverse R SVG-OM1), removal of IABP, placement of temporary pacing wire    PRE-MALIGNANT / BENIGN SKIN LESION EXCISION  11/11/2008    back     TRANSESOPHAGEAL ECHOCARDIOGRAM  05/09/2018    during emergent CABG       CURRENT MEDICATIONS       Previous Medications    ASPIRIN 81 MG EC TABLET    Take 1 tablet by mouth daily    ATORVASTATIN (LIPITOR) 20 MG TABLET    Take 1 tablet by mouth nightly    CLOPIDOGREL (PLAVIX) 75 MG TABLET    Take 1 tablet by mouth daily    METOPROLOL SUCCINATE (TOPROL XL) 25 MG EXTENDED RELEASE TABLET    Take 0.5 tablets by mouth daily    MULTIPLE VITAMINS-MINERALS (EYE VITAMINS & MINERALS PO)    Take 1 tablet by mouth daily        ALLERGIES     Patient has no known allergies.     FAMILY HISTORY       Family History   Problem Relation Age of Onset    Breast Cancer Sister     Other Brother         AAA       SOCIAL HISTORY       Social History     Social History    Marital status:

## 2018-12-21 ENCOUNTER — OFFICE VISIT (OUTPATIENT)
Dept: FAMILY MEDICINE CLINIC | Age: 83
End: 2018-12-21
Payer: MEDICARE

## 2018-12-21 VITALS
DIASTOLIC BLOOD PRESSURE: 76 MMHG | OXYGEN SATURATION: 98 % | RESPIRATION RATE: 16 BRPM | HEIGHT: 68 IN | HEART RATE: 59 BPM | WEIGHT: 162 LBS | BODY MASS INDEX: 24.55 KG/M2 | SYSTOLIC BLOOD PRESSURE: 122 MMHG

## 2018-12-21 DIAGNOSIS — E78.00 HYPERCHOLESTEROLEMIA: ICD-10-CM

## 2018-12-21 DIAGNOSIS — Z98.890 S/P COLONOSCOPY: ICD-10-CM

## 2018-12-21 DIAGNOSIS — I71.40 ABDOMINAL AORTIC ANEURYSM (AAA) WITHOUT RUPTURE: Primary | ICD-10-CM

## 2018-12-21 DIAGNOSIS — Z95.1 S/P CABG X 2: ICD-10-CM

## 2018-12-21 DIAGNOSIS — I49.9 IRREGULAR HEART RATE: ICD-10-CM

## 2018-12-21 PROCEDURE — 1123F ACP DISCUSS/DSCN MKR DOCD: CPT | Performed by: INTERNAL MEDICINE

## 2018-12-21 PROCEDURE — G8484 FLU IMMUNIZE NO ADMIN: HCPCS | Performed by: INTERNAL MEDICINE

## 2018-12-21 PROCEDURE — G8420 CALC BMI NORM PARAMETERS: HCPCS | Performed by: INTERNAL MEDICINE

## 2018-12-21 PROCEDURE — 1101F PT FALLS ASSESS-DOCD LE1/YR: CPT | Performed by: INTERNAL MEDICINE

## 2018-12-21 PROCEDURE — 1036F TOBACCO NON-USER: CPT | Performed by: INTERNAL MEDICINE

## 2018-12-21 PROCEDURE — G8427 DOCREV CUR MEDS BY ELIG CLIN: HCPCS | Performed by: INTERNAL MEDICINE

## 2018-12-21 PROCEDURE — 93000 ELECTROCARDIOGRAM COMPLETE: CPT | Performed by: INTERNAL MEDICINE

## 2018-12-21 PROCEDURE — 4040F PNEUMOC VAC/ADMIN/RCVD: CPT | Performed by: INTERNAL MEDICINE

## 2018-12-21 PROCEDURE — G8598 ASA/ANTIPLAT THER USED: HCPCS | Performed by: INTERNAL MEDICINE

## 2018-12-21 PROCEDURE — 99214 OFFICE O/P EST MOD 30 MIN: CPT | Performed by: INTERNAL MEDICINE

## 2018-12-21 ASSESSMENT — ENCOUNTER SYMPTOMS
CONSTIPATION: 0
SHORTNESS OF BREATH: 0
WHEEZING: 0
DIARRHEA: 0

## 2019-03-19 DIAGNOSIS — Z95.1 S/P CABG X 2: ICD-10-CM

## 2019-03-19 RX ORDER — ATORVASTATIN CALCIUM 20 MG/1
20 TABLET, FILM COATED ORAL NIGHTLY
Qty: 90 TABLET | Refills: 3 | Status: SHIPPED | OUTPATIENT
Start: 2019-03-19 | End: 2019-10-09 | Stop reason: SDUPTHER

## 2019-03-27 DIAGNOSIS — Z95.1 S/P CABG X 2: ICD-10-CM

## 2019-03-28 RX ORDER — CLOPIDOGREL BISULFATE 75 MG/1
75 TABLET ORAL DAILY
Qty: 90 TABLET | Refills: 3 | Status: SHIPPED | OUTPATIENT
Start: 2019-03-28 | End: 2020-05-06

## 2019-07-11 DIAGNOSIS — Z95.1 S/P CABG X 2: ICD-10-CM

## 2019-07-11 RX ORDER — METOPROLOL SUCCINATE 25 MG/1
12.5 TABLET, EXTENDED RELEASE ORAL DAILY
Qty: 15 TABLET | Refills: 0 | Status: SHIPPED | OUTPATIENT
Start: 2019-07-11 | End: 2020-07-09 | Stop reason: CLARIF

## 2019-07-11 RX ORDER — METOPROLOL SUCCINATE 25 MG/1
12.5 TABLET, EXTENDED RELEASE ORAL DAILY
Qty: 90 TABLET | Refills: 3 | Status: SHIPPED | OUTPATIENT
Start: 2019-07-11 | End: 2019-10-09 | Stop reason: SDUPTHER

## 2019-10-09 DIAGNOSIS — Z95.1 S/P CABG X 2: ICD-10-CM

## 2019-10-09 RX ORDER — ATORVASTATIN CALCIUM 20 MG/1
20 TABLET, FILM COATED ORAL NIGHTLY
Qty: 90 TABLET | Refills: 5 | Status: SHIPPED | OUTPATIENT
Start: 2019-10-09 | End: 2020-10-26

## 2019-10-09 RX ORDER — METOPROLOL SUCCINATE 25 MG/1
12.5 TABLET, EXTENDED RELEASE ORAL DAILY
Qty: 90 TABLET | Refills: 5 | Status: SHIPPED | OUTPATIENT
Start: 2019-10-09 | End: 2020-10-26

## 2020-05-06 ENCOUNTER — VIRTUAL VISIT (OUTPATIENT)
Dept: FAMILY MEDICINE CLINIC | Age: 85
End: 2020-05-06
Payer: MEDICARE

## 2020-05-06 PROCEDURE — 99443 PR PHYS/QHP TELEPHONE EVALUATION 21-30 MIN: CPT | Performed by: INTERNAL MEDICINE

## 2020-07-09 ENCOUNTER — OFFICE VISIT (OUTPATIENT)
Dept: SURGERY | Age: 85
End: 2020-07-09
Payer: MEDICARE

## 2020-07-09 ENCOUNTER — PROCEDURE VISIT (OUTPATIENT)
Dept: SURGERY | Age: 85
End: 2020-07-09
Payer: MEDICARE

## 2020-07-09 VITALS — WEIGHT: 163 LBS | BODY MASS INDEX: 24.78 KG/M2 | SYSTOLIC BLOOD PRESSURE: 144 MMHG | DIASTOLIC BLOOD PRESSURE: 70 MMHG

## 2020-07-09 DIAGNOSIS — E78.00 HYPERCHOLESTEROLEMIA: ICD-10-CM

## 2020-07-09 DIAGNOSIS — I10 HYPERTENSION, UNSPECIFIED TYPE: ICD-10-CM

## 2020-07-09 DIAGNOSIS — Z95.1 S/P CABG X 2: ICD-10-CM

## 2020-07-09 LAB
A/G RATIO: 2.3 (ref 1.1–2.2)
ALBUMIN SERPL-MCNC: 4.1 G/DL (ref 3.4–5)
ALP BLD-CCNC: 53 U/L (ref 40–129)
ALT SERPL-CCNC: 20 U/L (ref 10–40)
ANION GAP SERPL CALCULATED.3IONS-SCNC: 12 MMOL/L (ref 3–16)
AST SERPL-CCNC: 21 U/L (ref 15–37)
BASOPHILS ABSOLUTE: 0 K/UL (ref 0–0.2)
BASOPHILS RELATIVE PERCENT: 0.4 %
BILIRUB SERPL-MCNC: 0.5 MG/DL (ref 0–1)
BUN BLDV-MCNC: 15 MG/DL (ref 7–20)
CALCIUM SERPL-MCNC: 8.8 MG/DL (ref 8.3–10.6)
CHLORIDE BLD-SCNC: 107 MMOL/L (ref 99–110)
CHOLESTEROL, TOTAL: 125 MG/DL (ref 0–199)
CO2: 25 MMOL/L (ref 21–32)
CREAT SERPL-MCNC: 0.7 MG/DL (ref 0.8–1.3)
EOSINOPHILS ABSOLUTE: 0.1 K/UL (ref 0–0.6)
EOSINOPHILS RELATIVE PERCENT: 1.6 %
GFR AFRICAN AMERICAN: >60
GFR NON-AFRICAN AMERICAN: >60
GLOBULIN: 1.8 G/DL
GLUCOSE BLD-MCNC: 90 MG/DL (ref 70–99)
HCT VFR BLD CALC: 44.2 % (ref 40.5–52.5)
HDLC SERPL-MCNC: 58 MG/DL (ref 40–60)
HEMOGLOBIN: 14.7 G/DL (ref 13.5–17.5)
LDL CHOLESTEROL CALCULATED: 58 MG/DL
LYMPHOCYTES ABSOLUTE: 1.3 K/UL (ref 1–5.1)
LYMPHOCYTES RELATIVE PERCENT: 28.1 %
MCH RBC QN AUTO: 31.3 PG (ref 26–34)
MCHC RBC AUTO-ENTMCNC: 33.3 G/DL (ref 31–36)
MCV RBC AUTO: 93.9 FL (ref 80–100)
MONOCYTES ABSOLUTE: 0.5 K/UL (ref 0–1.3)
MONOCYTES RELATIVE PERCENT: 10.2 %
NEUTROPHILS ABSOLUTE: 2.8 K/UL (ref 1.7–7.7)
NEUTROPHILS RELATIVE PERCENT: 59.7 %
PDW BLD-RTO: 14.2 % (ref 12.4–15.4)
PLATELET # BLD: 159 K/UL (ref 135–450)
PMV BLD AUTO: 8.9 FL (ref 5–10.5)
POTASSIUM SERPL-SCNC: 4.5 MMOL/L (ref 3.5–5.1)
RBC # BLD: 4.71 M/UL (ref 4.2–5.9)
SODIUM BLD-SCNC: 144 MMOL/L (ref 136–145)
TOTAL PROTEIN: 5.9 G/DL (ref 6.4–8.2)
TRIGL SERPL-MCNC: 43 MG/DL (ref 0–150)
VLDLC SERPL CALC-MCNC: 9 MG/DL
WBC # BLD: 4.8 K/UL (ref 4–11)

## 2020-07-09 PROCEDURE — 4040F PNEUMOC VAC/ADMIN/RCVD: CPT | Performed by: NURSE PRACTITIONER

## 2020-07-09 PROCEDURE — 1036F TOBACCO NON-USER: CPT | Performed by: NURSE PRACTITIONER

## 2020-07-09 PROCEDURE — G8427 DOCREV CUR MEDS BY ELIG CLIN: HCPCS | Performed by: NURSE PRACTITIONER

## 2020-07-09 PROCEDURE — G8420 CALC BMI NORM PARAMETERS: HCPCS | Performed by: NURSE PRACTITIONER

## 2020-07-09 PROCEDURE — 99214 OFFICE O/P EST MOD 30 MIN: CPT | Performed by: NURSE PRACTITIONER

## 2020-07-09 PROCEDURE — 1123F ACP DISCUSS/DSCN MKR DOCD: CPT | Performed by: NURSE PRACTITIONER

## 2020-07-09 PROCEDURE — 93978 VASCULAR STUDY: CPT | Performed by: SURGERY

## 2020-07-09 ASSESSMENT — ENCOUNTER SYMPTOMS: ABDOMINAL PAIN: 0

## 2020-07-09 NOTE — PATIENT INSTRUCTIONS
Return in about 1 year (around 7/9/2021), or AAA & Office Visit. PATIENT EDUCATION focused on A&P of aneurysms and strong familial incidence. Patient was informed that smoking and high blood pressure can affect aneurysms. Smoking can thin vessels and high blood pressure results in too much pressure inside the vessel, which can potentially stretch it further. I explained that it is important to make family members aware of the influence of genetic factors in the development of an aneurysm.

## 2020-07-09 NOTE — PROGRESS NOTES
Subjective:      Patient ID: Irvin Perez is a 81 y.o. male. Pain Assessment  The patient is currently not experiencing any pain at this time. KAILEY Bernstein is a 81 y.o. male who presents with a complaint of Aneurysm follow up. The Aneurysm is located in the abdominal aorta. Patient was last seen 6/4/2018. Previous measurement was 3.8 cm x 3.7 cm per CT scan (it was an incidental finding) at last evaluation. Current measurement is 3.51 cm x 3.42  cm per duplex scan. His right common iliac artery measures 1.64 cm. The patient has been asymptomatic since last visit. Contributing factors include family history (his brother has a AAA and his father had one), hypertension and previous use of tobacco.  There has been no relevant prior surgery. Patient is s/p coronary artery bypass X2 with reverse RLE saphenous vein 5/10/18. Review of Systems   Constitutional: Negative. Gastrointestinal: Negative for abdominal pain. Skin: Positive for wound (RLE endovein harvest site for CABG). Neurological: Negative for speech difficulty and weakness. All other systems reviewed and are negative. No Known Allergies    Prior to Visit Medications    Medication Sig Taking? Authorizing Provider   metoprolol succinate (TOPROL XL) 25 MG extended release tablet Take 0.5 tablets by mouth daily Yes Ryan Moreno MD   atorvastatin (LIPITOR) 20 MG tablet Take 1 tablet by mouth nightly Yes Ryan Moreno MD   Multiple Vitamins-Minerals (EYE VITAMINS & MINERALS PO) Take 1 tablet by mouth daily  Yes Historical Provider, MD   aspirin 81 MG EC tablet Take 1 tablet by mouth daily Yes MEGHAN Guillory - CNP     History reviewed. Objective:   Physical Exam   Constitutional: He is oriented to person, place, and time. He appears well-developed and well-nourished. He is active and cooperative. No distress. HENT:   Head: Normocephalic.    Right Ear: Hearing normal.   Left Ear: Hearing normal.   Eyes: Pupils are equal, round, and reactive to light. Conjunctivae and lids are normal.   Neck: Trachea normal and normal range of motion. Neck supple. Carotid bruit is not present. No tracheal deviation present. Cardiovascular: Normal rate, regular rhythm and normal heart sounds. Exam reveals no gallop and no friction rub. No murmur heard. Pulses:       Femoral pulses are 2+ on the right side and 2+ on the left side. Popliteal pulses are 2+ on the right side and 2+ on the left side. Dorsalis pedis pulses are 2+ on the right side and 2+ on the left side. Posterior tibial pulses are 2+ on the right side and 2+ on the left side. Pulmonary/Chest: Effort normal and breath sounds normal. No respiratory distress. He has no wheezes. Abdominal: Soft. Normal appearance and bowel sounds are normal. He exhibits no distension and no mass. Pulsatile midline mass: AAA measures 3.51 cm x 3.42 cm with right ANTIONE 1.64 cm. There is no abdominal tenderness. There is no rigidity, no rebound and no guarding. Musculoskeletal: Normal range of motion. General: No edema. Neurological: He is alert and oriented to person, place, and time. He has normal strength. Skin: Skin is warm and dry. No rash noted. No erythema. Psychiatric: He has a normal mood and affect. His speech is normal and behavior is normal. Thought content normal. Cognition and memory are normal.   Vitals reviewed. Assessment:       Diagnosis   1. Abdominal aortic aneurysm (AAA) without rupture ([address]    2. Family history of abdominal aortic aneurysm (AAA) - brother   3. Hypertension, unspecified type - stable, on metoprolol   4. Hypercholesterolemia - stable, on atorvastatin         PATIENT EDUCATION focused on A&P of aneurysms and strong familial incidence. Patient was informed that smoking and high blood pressure can affect aneurysms.   Smoking can thin vessels and high blood pressure results in too much pressure inside the vessel, which can potentially stretch it further. I explained that it is important to make family members aware of the influence of genetic factors in the development of an aneurysm. Plan:       Return in about 1 year (around 6/4/2019) for AAA scan with office visit. Araseli Shi MA, am scribing for and in the presence of Michael Duong CNP on this date of 07/09/20 at 1:59 PM     I Michael Duong CNP, personally performed the services described in this documentation as scribed by the Medical Assistant Philippe Crenshaw in my presence and it is both accurate and complete.

## 2020-07-09 NOTE — LETTER
[facility]  [address] [place] [address]  Phone: [phone]  Fax: [phone]    MEGHAN Nicolas CNP        July 9, 2020      Galina Whitley [address]     Patient: Elizabeth Wells    MR Number: <[id number]>    YOB: 1935    Date of Visit: 7/9/2020        Dear Cassia Baker:    I saw your patient Elizabeth Wells, in the office for surveillance of an abdominal aortic aneurysm. His ultrasound showed *** no significant change or a slight increase in the size of his aneurysm. I have asked the patient to follow up in *** 6 months or 1 year with a repeat ultrasound. I will keep you posted regarding this patient's progress.     Sincerely,      MEGHAN Nicolas CNP

## 2020-07-09 NOTE — LETTER
[facility]  [address] [place] [address]  [facility] [address]  Phone: [phone]  Fax: [phone]    MEGHAN Polo CNP        July 9, 2020      Galina Green [address]     Patient: Merrick Merino    MR Number: <[id number]>    YOB: 1935    Date of Visit: 7/9/2020        Dear Meaghan Olguin:    I saw your patient Merrick Merino, in the office for surveillance of an abdominal aortic aneurysm. His ultrasound showed no significant change in the size of his aneurysm. I have asked the patient to follow up in 1 year with a repeat ultrasound. I will keep you posted regarding this patient's progress.     Sincerely,      MEGHAN Polo CNP

## 2020-10-26 RX ORDER — ATORVASTATIN CALCIUM 20 MG/1
TABLET, FILM COATED ORAL
Qty: 90 TABLET | Refills: 0 | Status: SHIPPED | OUTPATIENT
Start: 2020-10-26 | End: 2021-02-22 | Stop reason: SDUPTHER

## 2020-10-26 RX ORDER — METOPROLOL SUCCINATE 25 MG/1
TABLET, EXTENDED RELEASE ORAL
Qty: 45 TABLET | Refills: 0 | Status: SHIPPED | OUTPATIENT
Start: 2020-10-26 | End: 2021-02-03 | Stop reason: SDUPTHER

## 2021-01-25 DIAGNOSIS — Z95.1 S/P CABG X 2: ICD-10-CM

## 2021-01-25 RX ORDER — ATORVASTATIN CALCIUM 20 MG/1
TABLET, FILM COATED ORAL
Qty: 90 TABLET | Refills: 0 | OUTPATIENT
Start: 2021-01-25

## 2021-01-25 RX ORDER — METOPROLOL SUCCINATE 25 MG/1
TABLET, EXTENDED RELEASE ORAL
Qty: 45 TABLET | Refills: 0 | OUTPATIENT
Start: 2021-01-25

## 2021-02-03 DIAGNOSIS — Z95.1 S/P CABG X 2: ICD-10-CM

## 2021-02-03 NOTE — TELEPHONE ENCOUNTER
Pt has appt scheduled for 2/22/21  Medication Refill    Medication needing refilled: METOPROLOL   Dosage of the medication:25mg    How are you taking this medication (QD, BID, TID, QID, PRN): TAKE 1/2 TABLET EVERY DAY    30 or 90 day supply called in:30    When will you run out of your medication:OUT NOW, [address] are we sending the medication to?: [address] [address]    #  f# [phone]

## 2021-02-05 ENCOUNTER — TELEPHONE (OUTPATIENT)
Dept: CARDIOLOGY CLINIC | Age: 86
End: 2021-02-05

## 2021-02-05 RX ORDER — METOPROLOL SUCCINATE 25 MG/1
TABLET, EXTENDED RELEASE ORAL
Qty: 15 TABLET | Refills: 0 | Status: SHIPPED | OUTPATIENT
Start: 2021-02-05 | End: 2021-02-22 | Stop reason: SDUPTHER

## 2021-02-05 NOTE — TELEPHONE ENCOUNTER
Medication Refill    Medication needing refilled:METPOROLOL    Dosage of the medication:  25MG  How are you taking this medication (QD, BID, TID, QID, PRN):1/2 TABLET EVERY DAY    30 or 90 day supply called in:30    When will you run out of your medication:1/31/2021    Which Pharmacy are we sending the medication to?: [facility]  [address] [facility] [phone]

## 2021-02-19 NOTE — PROGRESS NOTES
[address]  Cardiology Progress Note      Norman Arnold  1935, 81 y.o.      CC: \" I'm fine. \"             Mary Damico MD:      HPI:   This is a 81 y.o. male who came for a stress test to the hospital because of epigastric burning. The stress test was markedly abnormal.  He then underwent coronary angiography which showed critical ostial left main disease. He underwent emergency CABGx2 on 5/9/18 LIMA-LAD, reserve SVG-OM1, IABP. Extubated postop day one. Had transient heart block on telemetry monitoring with pacing wires working on demand. Returns for follow up and management of CAD. Has been well with no new cardiac complaints. Says he received COVID-19 vaccination. Remains active at home. Taking all medication and tolerating. Today, denies any dyspnea, chest pain, palpitations and dizziness. Past Medical History:   Diagnosis Date    AAA (abdominal aortic aneurysm) ([address] 05/09/2018    3.8 cm    Coronary artery disease 05/09/2018    s/p CABG    Macular degeneration     Thrombophlebitis 2016    left lower extremity      Past Surgical History:   Procedure Laterality Date    CARDIAC CATHETERIZATION  05/09/2018    Dr. Skylar Padilla. David - w/placement of IABP    COLONOSCOPY  11/2018    Dr. Nichelle Price COLONOSCOPY N/A 11/12/2018    COLONOSCOPY POLYPECTOMY SNARE/COLD BIOPSY performed by Chris Claude, MD at [address] COLONOSCOPY N/A 11/12/2018    COLONOSCOPY CONTROL HEMORRHAGE performed by Chris Claude, MD at [facility] [address]  05/09/2018    Dr. Connie Dahl - emergent x2 (LIMA-LAD, reverse R SVG-OM1), removal of IABP, placement of temporary pacing wire    PRE-MALIGNANT / BENIGN SKIN LESION EXCISION  11/11/2008    back     TRANSESOPHAGEAL ECHOCARDIOGRAM  05/09/2018    during emergent CABG      Family History   Problem Relation Age of Onset    Breast Cancer Sister     Other Brother         AAA      Social History     Tobacco Use    Smoking status: Former Smoker     Packs/day: 1.00     Years: 50.00     Pack years: 50.00     Types: Cigarettes     Start date: 81     Quit date: 2008     Years since quittin.1    Smokeless tobacco: Never Used    Tobacco comment: counseled on tobacco exposure avoidance   Substance Use Topics    Alcohol use: Yes     Alcohol/week: 1.0 standard drinks     Types: 1 Standard drinks or equivalent per week     Comment: 1 / night beer    Drug use: No     No Known Allergies      Review of Systems -   Constitutional: Negative for weight gain/loss; malaise, fever  Respiratory: Negative for Asthma;  cough and hemoptysis  Cardiovascular: Negative for palpitations,dizziness   Gastrointestinal: Negative for abd.pain; constipation/diarrhea;    Genitourinary: Negative for stones; hematuria; frequency hesitancy  Integumentt: Negative for rash or pruritis  Hematologic/lymphatic: Negative for blood dyscrasia; leukemia/lymphoma  Musculoskeletal: Negative for Connective tissue disease  Neurological:  Negative for Seizure   Behavioral/Psych:Negative for Bipolar disorder, Schizophrenia; Dementia  Endocrine: negative for thyroid, parathyroid disease    Physical Examination:    /82   Pulse 56   Temp 97.3 °F (36.3 °C)   Ht 5' 8\" (1.727 m)   Wt 163 lb (73.9 kg)   SpO2 92%   BMI 24.78 kg/m²    HEENT:  Face: Atraumatic, Conjunctiva: Pink; non icteric,  Mucous Memb:  Moist, No thyromegaly or Lymphadenopathy  Respiratory:  Resp Assessment: WNL, Resp Auscultation: Clear  Cardiovascular: Auscultation: nl S1 & S2, Palpation:  Nl PMI;  No heaves or thrills, JVP:  normal  Abdomen: Soft, non-tender, Normal bowel sounds,  No organomegaly  Extremities: No Cyanosis or Clubbing  Neurological: Oriented to time, place, and person, Non-anxious  Psychiatric: Normal mood and affect  Skin: Warm and dry,  No rash seen     Outpatient Medications Marked as Taking for the 21 encounter (Office Visit) with Shama Contreras MD   Medication Sig Dispense Refill  metoprolol succinate (TOPROL XL) 25 MG extended release tablet TAKE 1/2 TABLET EVERY DAY 15 tablet 0    atorvastatin (LIPITOR) 20 MG tablet TAKE 1 TABLET EVERY NIGHT 90 tablet 0    aspirin 81 MG EC tablet Take 1 tablet by mouth daily 30 tablet 3       Labs:   Lab Results   Component Value Date    HDL 58 2020    HDL 57 01/10/2012    LDLCALC 58 2020    TRIG 43 2020       EK21, sinus rhythm     Chest X-Ray: 18     FINDINGS:   Jugular central venous introducer sheath remains in place on the right side. The heart size is stable.  Bibasilar volume loss persists.  Bilateral pleural   effusions persist.  No new airspace disease.           ECHO: none on file     Stress Test: 18  Summary    Treadmill MPI Results        1. Technically a satisfactory study.    2. Positive treadmill exercise stress portion of the study.    3. Uniform distribution with no perfusion defect in stress images. There is    Transient ischemic dilitation. This may represent severe ischemia.    4. Gated Study shows normal LV size and Systolic function; EF is 90%.    5. The patient exercised for 4.30 min. on the Clarke protocol to achieve a HR    of 110, which is 79 % of PMHR. The study was stopped due to exercise induced    shortness of breath. There was no chest pain .  ECG showed ST elevation in    precordial leads and widespread ST depression during the recovery phase.    This took 12 min to return to normal.     Peak HR:110 bpm                                 HR/BP product:80422    Peak BP:151/72 mmHg                             Max exercise: 7 METS    Predicted HR: 139 bpm    % of predicted HR: 79    Test duration:4 min and 30 sec    Reason for termination:Physiologic Maximum        ECG Findings    Strongly positive stress test with ST elevation in precordial leads and    diffuse ST depressions       Corornary angiogram  & Intervention: 18  1.  The left main has ostial 95% lesion and the LAD and the circumflex appear to have no obstructive disease.  Only one picture was taken. 2.  The right coronary artery is a dominant vessel and is free of disease. CABGX2: 5/9/18  LIMA-LAD, reserve SVG-OM1,    Aorta Iliac Duplex Study: 7/2020     Unilateral ImpressionBilobed fusiform abdominal aortic aneurysm with maximum   diameter of 3.51 x 3.42 cm. 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           Aorto-Iliac aneurysms with dimensions as noted above    Normal aorto-iliac blood flow    No increase in diameters when compared to previous studies         ASSESSMENT AND PLAN:    CAD/CABGx2  LIMA-LAD, reserve SVG-OM. Continue ASA, Statin and B-blocker  LDL 58 (7/9/2020)    Essential Hypertension  BP is stable   Continue risk factor modification. Abdominal Aortic Aneurysm  Without rupture. 3.8 cm in diameter  5/9/18.  3.5 x 3.42 cm 7/9/20    Follow up in 1 year. Thank you very much for allowing me to participate in the care of your patient. Please do not hesitate to contact me if you have any questions. Sincerely,    Samuel Neville M.D  [facility], [address] [place] [address]  Ph: [phone]  Fax: [phone]    This note was scribed in the presence of Dr. Samuel Neville MD by Dhaval Vega  Physician Attestation:  The scribes documentation has been prepared under my direction and personally reviewed by me in its entirety. I confirm that the note above accurately reflects all work, treatment, procedures, and medical decision making performed by me.

## 2021-02-22 ENCOUNTER — OFFICE VISIT (OUTPATIENT)
Dept: CARDIOLOGY CLINIC | Age: 86
End: 2021-02-22
Payer: MEDICARE

## 2021-02-22 VITALS
BODY MASS INDEX: 24.71 KG/M2 | TEMPERATURE: 97.3 F | OXYGEN SATURATION: 92 % | HEART RATE: 56 BPM | SYSTOLIC BLOOD PRESSURE: 138 MMHG | WEIGHT: 163 LBS | DIASTOLIC BLOOD PRESSURE: 82 MMHG | HEIGHT: 68 IN

## 2021-02-22 DIAGNOSIS — Z95.1 S/P CABG X 2: ICD-10-CM

## 2021-02-22 DIAGNOSIS — I71.40 ABDOMINAL AORTIC ANEURYSM WITHOUT RUPTURE: ICD-10-CM

## 2021-02-22 DIAGNOSIS — I25.10 CAD IN NATIVE ARTERY: Primary | ICD-10-CM

## 2021-02-22 DIAGNOSIS — I10 ESSENTIAL HYPERTENSION: ICD-10-CM

## 2021-02-22 PROCEDURE — 99212 OFFICE O/P EST SF 10 MIN: CPT | Performed by: INTERNAL MEDICINE

## 2021-02-22 PROCEDURE — 4040F PNEUMOC VAC/ADMIN/RCVD: CPT | Performed by: INTERNAL MEDICINE

## 2021-02-22 PROCEDURE — G8484 FLU IMMUNIZE NO ADMIN: HCPCS | Performed by: INTERNAL MEDICINE

## 2021-02-22 PROCEDURE — G8420 CALC BMI NORM PARAMETERS: HCPCS | Performed by: INTERNAL MEDICINE

## 2021-02-22 PROCEDURE — 1036F TOBACCO NON-USER: CPT | Performed by: INTERNAL MEDICINE

## 2021-02-22 PROCEDURE — 1123F ACP DISCUSS/DSCN MKR DOCD: CPT | Performed by: INTERNAL MEDICINE

## 2021-02-22 PROCEDURE — G8427 DOCREV CUR MEDS BY ELIG CLIN: HCPCS | Performed by: INTERNAL MEDICINE

## 2021-02-22 RX ORDER — METOPROLOL SUCCINATE 25 MG/1
TABLET, EXTENDED RELEASE ORAL
Qty: 90 TABLET | Refills: 5 | Status: SHIPPED | OUTPATIENT
Start: 2021-02-22 | End: 2021-06-07 | Stop reason: SDUPTHER

## 2021-02-22 RX ORDER — ASPIRIN 81 MG/1
81 TABLET ORAL DAILY
Qty: 90 TABLET | Refills: 5 | Status: SHIPPED | OUTPATIENT
Start: 2021-02-22

## 2021-02-22 RX ORDER — ATORVASTATIN CALCIUM 20 MG/1
TABLET, FILM COATED ORAL
Qty: 90 TABLET | Refills: 5 | Status: SHIPPED | OUTPATIENT
Start: 2021-02-22 | End: 2022-03-28

## 2021-02-22 NOTE — PATIENT INSTRUCTIONS
Patient Education        A Healthy Heart: Care Instructions  Your Care Instructions     Coronary artery disease, also called heart disease, occurs when a substance called plaque builds up in the vessels that supply oxygen-rich blood to your heart muscle. This can narrow the blood vessels and reduce blood flow. A heart attack happens when blood flow is completely blocked. A high-fat diet, smoking, and other factors increase the risk of heart disease. Your doctor has found that you have a chance of having heart disease. You can do lots of things to keep your heart healthy. It may not be easy, but you can change your diet, exercise more, and quit smoking. These steps really work to lower your chance of heart disease. Follow-up care is a key part of your treatment and safety. Be sure to make and go to all appointments, and call your doctor if you are having problems. It's also a good idea to know your test results and keep a list of the medicines you take. How can you care for yourself at home? Diet    · Use less salt when you cook and eat. This helps lower your blood pressure. Taste food before salting. Add only a little salt when you think you need it. With time, your taste buds will adjust to less salt.     · Eat fewer snack items, fast foods, canned soups, and other high-salt, high-fat, processed foods.     · Read food labels and try to avoid saturated and trans fats. They increase your risk of heart disease by raising cholesterol levels.     · Limit the amount of solid fat-butter, margarine, and shortening-you eat. Use olive, peanut, or canola oil when you cook. Bake, broil, and steam foods instead of frying them.     · Eat a variety of fruit and vegetables every day. Dark green, deep orange, red, or yellow fruits and vegetables are especially good for you.  Examples include spinach, carrots, peaches, and berries.     · Foods high in fiber can reduce your cholesterol and provide important vitamins and minerals. High-fiber foods include whole-grain cereals and breads, oatmeal, beans, brown rice, citrus fruits, and apples.     · Eat lean proteins. Heart-healthy proteins include seafood, lean meats and poultry, eggs, beans, peas, nuts, seeds, and soy products.     · Limit drinks and foods with added sugar. These include candy, desserts, and soda pop. Lifestyle changes    · If your doctor recommends it, get more exercise. Walking is a good choice. Bit by bit, increase the amount you walk every day. Try for at least 30 minutes on most days of the week. You also may want to swim, bike, or do other activities.     · Do not smoke. If you need help quitting, talk to your doctor about stop-smoking programs and medicines. These can increase your chances of quitting for good. Quitting smoking may be the most important step you can take to protect your heart. It is never too late to quit.     · Limit alcohol to 2 drinks a day for men and 1 drink a day for women. Too much alcohol can cause health problems.     · Manage other health problems such as diabetes, high blood pressure, and high cholesterol. If you think you may have a problem with alcohol or drug use, talk to your doctor. Medicines    · Take your medicines exactly as prescribed. Call your doctor if you think you are having a problem with your medicine.     · If your doctor recommends aspirin, take the amount directed each day. Make sure you take aspirin and not another kind of pain reliever, such as acetaminophen (Tylenol). When should you call for help? Call 911 if you have symptoms of a heart attack. These may include:    · Chest pain or pressure, or a strange feeling in the chest.     · Sweating.     · Shortness of breath.     · Pain, pressure, or a strange feeling in the back, neck, jaw, or upper belly or in one or both shoulders or arms.     · Lightheadedness or sudden weakness.     · A fast or irregular heartbeat.    After you call 911, the  may tell you to chew 1 adult-strength or 2 to 4 low-dose aspirin. Wait for an ambulance. Do not try to drive yourself. Watch closely for changes in your health, and be sure to contact your doctor if you have any problems. Where can you learn more? Go to [email or web address] org and sign in to your [employer] account. Enter X786 in the [employer] box to learn more about \"A Healthy Heart: Care Instructions. \"     If you do not have an account, please click on the \"Sign Up Now\" link. Current as of: December 16, 2019               Content Version: 12.6  © 3900-7020 [employer]. Care instructions adapted under license by [facility] ([facility]). If you have questions about a medical condition or this instruction, always ask your healthcare professional. [address] any warranty or liability for your use of this information.

## 2021-02-23 PROCEDURE — 93000 ELECTROCARDIOGRAM COMPLETE: CPT | Performed by: INTERNAL MEDICINE

## 2021-06-07 DIAGNOSIS — Z95.1 S/P CABG X 2: ICD-10-CM

## 2021-06-07 RX ORDER — METOPROLOL SUCCINATE 25 MG/1
TABLET, EXTENDED RELEASE ORAL
Qty: 45 TABLET | Refills: 0 | Status: SHIPPED | OUTPATIENT
Start: 2021-06-07 | End: 2022-02-14 | Stop reason: SDUPTHER

## 2021-06-07 NOTE — TELEPHONE ENCOUNTER
Pt  Is leaving TOMORROW morning for vacation/ can not get [facility] mail order in time.       Medication Refill    Medication needing refilled:METOPROLOL SUCCINATE     Dosage of the medication:25mg    How are you taking this medication (QD, BID, TID, QID, PRN):TAKE 1/2 TABLET EVERY DAY    30 or 90 day supply called in:12 days please     When will you run out of your [address] are we sending the medication to?: PLEASE FOR ONE TIME SEND TO [address] [place] [id number]    # [phone]/    FAX # UNKNOWN STORE WAS CLOSED

## 2021-07-14 ENCOUNTER — OFFICE VISIT (OUTPATIENT)
Dept: FAMILY MEDICINE CLINIC | Age: 86
End: 2021-07-14
Payer: MEDICARE

## 2021-07-14 VITALS
BODY MASS INDEX: 23.98 KG/M2 | DIASTOLIC BLOOD PRESSURE: 78 MMHG | RESPIRATION RATE: 14 BRPM | HEIGHT: 68 IN | SYSTOLIC BLOOD PRESSURE: 144 MMHG | WEIGHT: 158.2 LBS | OXYGEN SATURATION: 98 % | HEART RATE: 53 BPM

## 2021-07-14 DIAGNOSIS — Z00.00 ENCOUNTER FOR ANNUAL WELLNESS EXAM IN MEDICARE PATIENT: Primary | ICD-10-CM

## 2021-07-14 DIAGNOSIS — I71.40 ABDOMINAL AORTIC ANEURYSM (AAA) WITHOUT RUPTURE: ICD-10-CM

## 2021-07-14 DIAGNOSIS — Z95.1 S/P CABG X 2: ICD-10-CM

## 2021-07-14 DIAGNOSIS — Z00.00 ROUTINE GENERAL MEDICAL EXAMINATION AT A HEALTH CARE FACILITY: ICD-10-CM

## 2021-07-14 DIAGNOSIS — E78.00 HYPERCHOLESTEROLEMIA: ICD-10-CM

## 2021-07-14 DIAGNOSIS — I72.3 ANEURYSM OF RIGHT COMMON ILIAC ARTERY (HCC): ICD-10-CM

## 2021-07-14 PROCEDURE — G0009 ADMIN PNEUMOCOCCAL VACCINE: HCPCS | Performed by: INTERNAL MEDICINE

## 2021-07-14 PROCEDURE — G0439 PPPS, SUBSEQ VISIT: HCPCS | Performed by: INTERNAL MEDICINE

## 2021-07-14 PROCEDURE — 90732 PPSV23 VACC 2 YRS+ SUBQ/IM: CPT | Performed by: INTERNAL MEDICINE

## 2021-07-14 PROCEDURE — 1123F ACP DISCUSS/DSCN MKR DOCD: CPT | Performed by: INTERNAL MEDICINE

## 2021-07-14 PROCEDURE — 4040F PNEUMOC VAC/ADMIN/RCVD: CPT | Performed by: INTERNAL MEDICINE

## 2021-07-14 RX ORDER — LISINOPRIL 10 MG/1
10 TABLET ORAL DAILY
Qty: 90 TABLET | Refills: 0 | Status: SHIPPED | OUTPATIENT
Start: 2021-07-14 | End: 2021-10-12 | Stop reason: SDUPTHER

## 2021-07-14 SDOH — ECONOMIC STABILITY: FOOD INSECURITY: WITHIN THE PAST 12 MONTHS, THE FOOD YOU BOUGHT JUST DIDN'T LAST AND YOU DIDN'T HAVE MONEY TO GET MORE.: NEVER TRUE

## 2021-07-14 SDOH — ECONOMIC STABILITY: FOOD INSECURITY: WITHIN THE PAST 12 MONTHS, YOU WORRIED THAT YOUR FOOD WOULD RUN OUT BEFORE YOU GOT MONEY TO BUY MORE.: NEVER TRUE

## 2021-07-14 ASSESSMENT — LIFESTYLE VARIABLES
HOW OFTEN DURING THE LAST YEAR HAVE YOU NEEDED AN ALCOHOLIC DRINK FIRST THING IN THE MORNING TO GET YOURSELF GOING AFTER A NIGHT OF HEAVY DRINKING: 0
HOW OFTEN DURING THE LAST YEAR HAVE YOU HAD A FEELING OF GUILT OR REMORSE AFTER DRINKING: 0
HOW OFTEN DO YOU HAVE SIX OR MORE DRINKS ON ONE OCCASION: 0
HOW OFTEN DURING THE LAST YEAR HAVE YOU FAILED TO DO WHAT WAS NORMALLY EXPECTED FROM YOU BECAUSE OF DRINKING: 0
AUDIT TOTAL SCORE: 1
HAS A RELATIVE, FRIEND, DOCTOR, OR ANOTHER HEALTH PROFESSIONAL EXPRESSED CONCERN ABOUT YOUR DRINKING OR SUGGESTED YOU CUT DOWN: 0
HOW OFTEN DURING THE LAST YEAR HAVE YOU FOUND THAT YOU WERE NOT ABLE TO STOP DRINKING ONCE YOU HAD STARTED: 0
HOW OFTEN DO YOU HAVE A DRINK CONTAINING ALCOHOL: 1
HAVE YOU OR SOMEONE ELSE BEEN INJURED AS A RESULT OF YOUR DRINKING: 0
HOW MANY STANDARD DRINKS CONTAINING ALCOHOL DO YOU HAVE ON A TYPICAL DAY: 0
AUDIT-C TOTAL SCORE: 1
HOW OFTEN DURING THE LAST YEAR HAVE YOU BEEN UNABLE TO REMEMBER WHAT HAPPENED THE NIGHT BEFORE BECAUSE YOU HAD BEEN DRINKING: 0

## 2021-07-14 ASSESSMENT — PATIENT HEALTH QUESTIONNAIRE - PHQ9
SUM OF ALL RESPONSES TO PHQ QUESTIONS 1-9: 0
SUM OF ALL RESPONSES TO PHQ QUESTIONS 1-9: 0
SUM OF ALL RESPONSES TO PHQ9 QUESTIONS 1 & 2: 0
2. FEELING DOWN, DEPRESSED OR HOPELESS: 0
SUM OF ALL RESPONSES TO PHQ QUESTIONS 1-9: 0
1. LITTLE INTEREST OR PLEASURE IN DOING THINGS: 0

## 2021-07-14 ASSESSMENT — SOCIAL DETERMINANTS OF HEALTH (SDOH): HOW HARD IS IT FOR YOU TO PAY FOR THE VERY BASICS LIKE FOOD, HOUSING, MEDICAL CARE, AND HEATING?: NOT HARD AT ALL

## 2021-07-14 NOTE — PROGRESS NOTES
Medicare Annual Wellness Visit  Name: Kristy Menchaca Date: 2021   MRN: <[id number]> Sex: Male   Age: 81 y.o. Ethnicity: Non-/Non    : 1935 Race: Mal Acosta is here for Medicare AWV    Screenings for behavioral, psychosocial and functional/safety risks, and cognitive dysfunction are all negative except as indicated below. These results, as well as other patient data from the [address] form, are documented in Flowsheets linked to this Encounter. No Known Allergies    Prior to Visit Medications    Medication Sig Taking? Authorizing Provider   metoprolol succinate (TOPROL XL) 25 MG extended release tablet TAKE 1/2 TABLET EVERY DAY Yes Mary Carmen Castañeda MD   atorvastatin (LIPITOR) 20 MG tablet TAKE 1 TABLET EVERY NIGHT Yes Mary Carmen Castañeda MD   aspirin 81 MG EC tablet Take 1 tablet by mouth daily Yes Mary Carmen Castañeda MD       Past Medical History:   Diagnosis Date    AAA [facility]abdominal aortic aneurysm) ([facility] [address] 2018    3.8 cm    Coronary artery disease 2018    s/p CABG    Macular degeneration     Thrombophlebitis 2016    left lower extremity       Past Surgical History:   Procedure Laterality Date    CARDIAC CATHETERIZATION  2018    Dr. Heather De. David - w/placement of IABP    COLONOSCOPY  2018    Dr. Jonathan Winter N/A 2018    COLONOSCOPY POLYPECTOMY SNARE/COLD BIOPSY performed by Wes Colvin MD at [address] COLONOSCOPY N/A 2018    COLONOSCOPY CONTROL HEMORRHAGE performed by Wes Colvin MD at [address] [place] [address] Drive  2018    Dr. Potter [place] - emergent x2 (LIMA-LAD, reverse R SVG-OM1), removal of IABP, placement of temporary pacing wire    PRE-MALIGNANT / BENIGN SKIN LESION EXCISION  2008    back     TRANSESOPHAGEAL ECHOCARDIOGRAM  2018    during emergent CABG       Family History   Problem Relation Age of Onset    Breast Cancer Sister     Other Brother         AAA CareTeam (Including outside providers/suppliers regularly involved in providing care):   Patient Care Team:  Dorian Vann MD as PCP - General (Internal Medicine)  Dorian Vann MD as PCP [facility]    Wt Readings from Last 3 Encounters:   07/14/21 158 lb 3.2 oz (71.8 kg)   02/22/21 163 lb (73.9 kg)   07/09/20 163 lb (73.9 kg)     Vitals:    07/14/21 1133 07/14/21 1141   BP: (!) 144/78 (!) 144/78   Pulse: 53    Resp: 14    SpO2: 98%    Weight: 158 lb 3.2 oz (71.8 kg)    Height: 5' 8\" (1.727 m)      Body mass index is 24.05 kg/m². Based upon direct observation of the patient, evaluation of cognition reveals no issues at all    Patient's complete Health Risk Assessment and screening values have been reviewed and are found in Flowsheets. The following problems were reviewed today and where indicated follow up appointments were made and/or referrals ordered.     Positive Risk Factor Screenings with Interventions:           Health Habits/Nutrition:  Health Habits/Nutrition  Do you exercise for at least 20 minutes 2-3 times per week?: (!) No  Have you lost any weight without trying in the past 3 months?: No  Do you eat only one meal per day?: No  Have you seen the dentist within the past year?: Yes  Body mass index: 24.05  Health Habits/Nutrition Interventions:  · Works all day   · Still working  Part time as as [employer]         Personalized Preventive Plan   Current Health Maintenance Status  Immunization History   Administered Date(s) Administered    COVID-19, Moderna, PF, 100mcg/0.5mL 01/23/2021, 02/20/2021    Pneumococcal Conjugate 13-valent ([facility]) 08/03/2015    Tdap (Boostrix, Adacel) 01/10/2012        Health Maintenance   Topic Date Due    Shingles Vaccine (1 of 2) Never done    Pneumococcal 65+ years Vaccine (2 of 2 - PPSV23) 08/03/2016    Annual Wellness Visit (AWV)  Never done    Lipid screen  07/09/2021    Flu vaccine (1) 09/01/2021    DTaP/Tdap/Td vaccine (2 - Td or Tdap) 01/10/2022    COVID-19 Vaccine  Completed    Hepatitis A vaccine  Aged Out    Hepatitis B vaccine  Aged Out    Hib vaccine  Aged Out    Meningococcal (ACWY) vaccine  Aged Out     Recommendations for [employer] Due: see orders and patient instructions/AVS.  . Recommended screening schedule for the next 5-10 years is provided to the patient in written form: see Patient Instructions/AVS.    Living  With wife  Had his immunization. 7/9/2020    Impressions       Unilateral ImpressionBilobed fusiform abdominal aortic aneurysm with maximum   diameter of 3.51 x 3.42 cm. 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       Conclusions        Summary        Aorto-Iliac aneurysms with dimensions as noted above    Normal aorto-iliac blood flow    No increase in diameters when compared to previous studies        Signature        ------------------------------------------------------------------       Just saw Dr. Mindi Wang   In  2/22/21   No chest pain  Energy level is ok  No abd pain    Physical Exam  Constitutional:       Appearance: Normal appearance. Cardiovascular:      Rate and Rhythm: Regular rhythm. Heart sounds: Normal heart sounds. Pulmonary:      Effort: Pulmonary effort is normal.      Breath sounds: Normal breath sounds. Skin:     Comments: Red , scalp  May have some AK      Neurological:      Mental Status: He is alert. Darryn Pizarro was seen today for medicare aw. 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

## 2021-07-14 NOTE — PATIENT INSTRUCTIONS
Make appt with Licha Levy with vascular surgery for aneurysm follow up. Check the blood work about  A week after starting the lisinopril    Personalized Preventive Plan for Chang Pizarro - 7/14/2021  Medicare offers a range of preventive health benefits. Some of the tests and screenings are paid in full while other may be subject to a deductible, co-insurance, and/or copay. Some of these benefits include a comprehensive review of your medical history including lifestyle, illnesses that may run in your family, and various assessments and screenings as appropriate. After reviewing your medical record and screening and assessments performed today your provider may have ordered immunizations, labs, imaging, and/or referrals for you. A list of these orders (if applicable) as well as your Preventive Care list are included within your After Visit Summary for your review. Other Preventive Recommendations:    · A preventive eye exam performed by an eye specialist is recommended every 1-2 years to screen for glaucoma; cataracts, macular degeneration, and other eye disorders. · A preventive dental visit is recommended every 6 months. · Try to get at least 150 minutes of exercise per week or 10,000 steps per day on a pedometer . · Order or download the FREE \"Exercise & Physical Activity: Your Everyday Guide\" from [employer]. Call 9-157.443.3149 or search [employer] online. · You need 9686-2926 mg of calcium and 2329-9949 IU of vitamin D per day. It is possible to meet your calcium requirement with diet alone, but a vitamin D supplement is usually necessary to meet this goal.  · When exposed to the sun, use a sunscreen that protects against both UVA and UVB radiation with an SPF of 30 or greater. Reapply every 2 to 3 hours or after sweating, drying off with a towel, or swimming. · Always wear a seat belt when traveling in a car.  Always wear a helmet when riding a bicycle or motorcycle.

## 2021-10-12 RX ORDER — LISINOPRIL 10 MG/1
10 TABLET ORAL DAILY
Qty: 90 TABLET | Refills: 0 | Status: SHIPPED | OUTPATIENT
Start: 2021-10-12 | End: 2022-01-17 | Stop reason: SDUPTHER

## 2021-10-12 NOTE — TELEPHONE ENCOUNTER
Medication Refill    Medication needing refilled:  lisinopril (PRINIVIL;ZESTRIL)    Dosage of the medication:  10 MG tablet     How are you taking this medication (QD, BID, TID, QID, PRN):  Take 1 tablet by mouth daily    30 or 90 day supply called in:    When will you run out of your medication:    Which Pharmacy are we sending the medication to?:    [address] [place] [address], [address] [place] [address]. [address]   Phone:  [phone]  Fax:  [phone]

## 2021-10-28 ENCOUNTER — OFFICE VISIT (OUTPATIENT)
Dept: FAMILY MEDICINE CLINIC | Age: 86
End: 2021-10-28
Payer: MEDICARE

## 2021-10-28 VITALS
OXYGEN SATURATION: 98 % | TEMPERATURE: 98.1 F | HEIGHT: 68 IN | DIASTOLIC BLOOD PRESSURE: 63 MMHG | WEIGHT: 157 LBS | HEART RATE: 56 BPM | SYSTOLIC BLOOD PRESSURE: 119 MMHG | BODY MASS INDEX: 23.79 KG/M2

## 2021-10-28 DIAGNOSIS — I10 PRIMARY HYPERTENSION: ICD-10-CM

## 2021-10-28 DIAGNOSIS — H61.23 BILATERAL IMPACTED CERUMEN: ICD-10-CM

## 2021-10-28 DIAGNOSIS — I25.10 CORONARY ARTERY DISEASE DUE TO LIPID RICH PLAQUE: Primary | ICD-10-CM

## 2021-10-28 DIAGNOSIS — I25.83 CORONARY ARTERY DISEASE DUE TO LIPID RICH PLAQUE: Primary | ICD-10-CM

## 2021-10-28 DIAGNOSIS — L57.8 SUN-DAMAGED SKIN: ICD-10-CM

## 2021-10-28 DIAGNOSIS — E78.00 HYPERCHOLESTEROLEMIA: ICD-10-CM

## 2021-10-28 LAB
A/G RATIO: 2 (ref 1.1–2.2)
ALBUMIN SERPL-MCNC: 4.5 G/DL (ref 3.4–5)
ALP BLD-CCNC: 72 U/L (ref 40–129)
ALT SERPL-CCNC: 17 U/L (ref 10–40)
ANION GAP SERPL CALCULATED.3IONS-SCNC: 14 MMOL/L (ref 3–16)
AST SERPL-CCNC: 17 U/L (ref 15–37)
BASOPHILS ABSOLUTE: 0 K/UL (ref 0–0.2)
BASOPHILS RELATIVE PERCENT: 0.4 %
BILIRUB SERPL-MCNC: 0.5 MG/DL (ref 0–1)
BUN BLDV-MCNC: 13 MG/DL (ref 7–20)
CALCIUM SERPL-MCNC: 9.5 MG/DL (ref 8.3–10.6)
CHLORIDE BLD-SCNC: 103 MMOL/L (ref 99–110)
CHOLESTEROL, TOTAL: 129 MG/DL (ref 0–199)
CO2: 24 MMOL/L (ref 21–32)
CREAT SERPL-MCNC: 0.7 MG/DL (ref 0.8–1.3)
EOSINOPHILS ABSOLUTE: 0.1 K/UL (ref 0–0.6)
EOSINOPHILS RELATIVE PERCENT: 1.4 %
GFR AFRICAN AMERICAN: >60
GFR NON-AFRICAN AMERICAN: >60
GLUCOSE BLD-MCNC: 89 MG/DL (ref 70–99)
HCT VFR BLD CALC: 42.5 % (ref 40.5–52.5)
HDLC SERPL-MCNC: 61 MG/DL (ref 40–60)
HEMOGLOBIN: 14.4 G/DL (ref 13.5–17.5)
LDL CHOLESTEROL CALCULATED: 58 MG/DL
LYMPHOCYTES ABSOLUTE: 1.3 K/UL (ref 1–5.1)
LYMPHOCYTES RELATIVE PERCENT: 24.5 %
MCH RBC QN AUTO: 31.5 PG (ref 26–34)
MCHC RBC AUTO-ENTMCNC: 33.8 G/DL (ref 31–36)
MCV RBC AUTO: 93.1 FL (ref 80–100)
MONOCYTES ABSOLUTE: 0.5 K/UL (ref 0–1.3)
MONOCYTES RELATIVE PERCENT: 8.8 %
NEUTROPHILS ABSOLUTE: 3.5 K/UL (ref 1.7–7.7)
NEUTROPHILS RELATIVE PERCENT: 64.9 %
PDW BLD-RTO: 13.7 % (ref 12.4–15.4)
PLATELET # BLD: 216 K/UL (ref 135–450)
PMV BLD AUTO: 8.9 FL (ref 5–10.5)
POTASSIUM SERPL-SCNC: 4.6 MMOL/L (ref 3.5–5.1)
RBC # BLD: 4.57 M/UL (ref 4.2–5.9)
SODIUM BLD-SCNC: 141 MMOL/L (ref 136–145)
TOTAL PROTEIN: 6.8 G/DL (ref 6.4–8.2)
TRIGL SERPL-MCNC: 48 MG/DL (ref 0–150)
VLDLC SERPL CALC-MCNC: 10 MG/DL
WBC # BLD: 5.4 K/UL (ref 4–11)

## 2021-10-28 PROCEDURE — 99213 OFFICE O/P EST LOW 20 MIN: CPT | Performed by: FAMILY MEDICINE

## 2021-10-28 PROCEDURE — 36415 COLL VENOUS BLD VENIPUNCTURE: CPT | Performed by: FAMILY MEDICINE

## 2021-10-28 NOTE — PROGRESS NOTES
A/P: CAD-stable, he is asymptomatic. He will continue with current meds, risk factor optimization. Hypertension-controlled, continue lisinopril    Hyperlipidemia-last LDL controlled, continue statin    Patient will continue following with cardiology, vascular surgery, ophthalmology    The importance of dermatology eval for his sun damaged skin, skin lesions emphasized. Wife would like to try and get him scheduled with her dermatologist, Dr. Flint Soulier. I let her know if this cannot be done, or appointment is not in the next 6 weeks, I would like him to be evaluated by plastic surgery to hopefully expedite evaluation time    He declines flu shot. Follow-up in 8 months or sooner if needed. O:   Vitals:    10/28/21 1353   BP: 119/63   Pulse: 56   Temp: 98.1 °F (36.7 °C)   SpO2: 98%     Gen- NAD, pleasant  Skin-he has significantly sun damaged skin of face, he has area of right maxillary of face less than a couple centimeters that concerns me for a squamous cell carcinoma, he has area of right neck carotid area with lesion of less than 1 cm that concerns me for a basal cell carcinoma  HEENT- Eyes without icterus or injection, cerumen obstruction of ear canals bilaterally, throat and tms unremarkable, ears irrigated successfully  Neck- Supple, no lymphadenopathy appreciated   Lungs- CTAB  Heart- RRR  Abd- Soft, non tender  Ext- No edema  Psych- Appropriate    S: CC-new patient  HPI- Mr. Devora Bliss presents to establish as new patient. He reports he is doing well. He has no concerns today. He has history of CAD, hypertension, hyperlipidemia, AAA, macular degeneration. He reports compliance with meds. He cannot remember the names of his specialist doctors. He reports that he sees cardiology, vascular surgery, ophthalmology.     ROS  Denies fever, chills, night sweats  Denies headaches, sore throat, ear pain  Denies chest pain, dyspnea, palpitations  Denies nausea, vomiting, diarrhea  Denies joint pain  Denies depression, anxiety  Denies rashes    Current Outpatient Medications   Medication Sig Dispense Refill    lisinopril (PRINIVIL;ZESTRIL) 10 MG tablet Take 1 tablet by mouth daily 90 tablet 0    metoprolol succinate (TOPROL XL) 25 MG extended release tablet TAKE 1/2 TABLET EVERY DAY 45 tablet 0    atorvastatin (LIPITOR) 20 MG tablet TAKE 1 TABLET EVERY NIGHT 90 tablet 5    aspirin 81 MG EC tablet Take 1 tablet by mouth daily 90 tablet 5     No current facility-administered medications for this visit. No Known Allergies     Past Medical History:   Diagnosis Date    AAA (abdominal aortic aneurysm) ([facility] [address] 05/09/2018    3.8 cm    Coronary artery disease 05/09/2018    s/p CABG    Macular degeneration     Thrombophlebitis 2016    left lower extremity        Past Surgical History:   Procedure Laterality Date    CARDIAC CATHETERIZATION  05/09/2018    Dr. Santino Lawrence. David - w/placement of IABP    COLONOSCOPY  11/2018    Dr. Rashawn Fitzgerald N/A 11/12/2018    COLONOSCOPY POLYPECTOMY SNARE/COLD BIOPSY performed by Jennifer San MD at [address] COLONOSCOPY N/A 11/12/2018    COLONOSCOPY CONTROL HEMORRHAGE performed by Jennifer San MD at [address] [place] [address]  05/09/2018    Dr. Remedios Melgar - emergent x2 (LIMA-LAD, reverse R SVG-OM1), removal of IABP, placement of temporary pacing wire    PRE-MALIGNANT / BENIGN SKIN LESION EXCISION  11/11/2008    back     TRANSESOPHAGEAL ECHOCARDIOGRAM  05/09/2018    during emergent CABG        Social History     Social History Narrative    , 2 children, , reupholster        Family History   Problem Relation Age of Onset    Breast Cancer Sister     Other Brother         AAA          Alan Marie DO

## 2021-12-13 ENCOUNTER — TELEPHONE (OUTPATIENT)
Dept: FAMILY MEDICINE CLINIC | Age: 86
End: 2021-12-13

## 2021-12-13 DIAGNOSIS — D49.2 NEOPLASM OF SKIN: Primary | ICD-10-CM

## 2021-12-13 DIAGNOSIS — L57.8 SUN-DAMAGED SKIN: ICD-10-CM

## 2021-12-13 NOTE — TELEPHONE ENCOUNTER
Pt's wife called in stating Pt was referred to Dr. Pao Seaman by Dr. Abe Meckel and they can not get him in until summer. Pt would like a referral to a different dermatologist, Pt states Dr. Natacha Castanon told Pt she could do a different dermatologist if need be. Please advise.  Pt is reachable at [phone]

## 2021-12-14 NOTE — TELEPHONE ENCOUNTER
Patients wife Oksana Rodriguez notified. Instructed to call back if they are also unable to see him sooner.

## 2021-12-15 ENCOUNTER — TELEPHONE (OUTPATIENT)
Dept: FAMILY MEDICINE CLINIC | Age: 86
End: 2021-12-15

## 2021-12-15 DIAGNOSIS — L57.0 ACTINIC KERATOSIS: Primary | ICD-10-CM

## 2022-01-14 NOTE — TELEPHONE ENCOUNTER
Medication:   Requested Prescriptions     Pending Prescriptions Disp Refills    lisinopril (PRINIVIL;ZESTRIL) 10 MG tablet 90 tablet 0     Sig: Take 1 tablet by mouth daily       Last Filled:      Patient Phone Number: [phone] (home)     Last appt: 10/28/2021   Next appt: 6/30/2022

## 2022-01-17 RX ORDER — LISINOPRIL 10 MG/1
10 TABLET ORAL DAILY
Qty: 90 TABLET | Refills: 1 | Status: SHIPPED | OUTPATIENT
Start: 2022-01-17 | End: 2022-05-16

## 2022-02-14 ENCOUNTER — OFFICE VISIT (OUTPATIENT)
Dept: CARDIOLOGY CLINIC | Age: 87
End: 2022-02-14
Payer: MEDICARE

## 2022-02-14 VITALS
HEIGHT: 68 IN | WEIGHT: 157 LBS | DIASTOLIC BLOOD PRESSURE: 72 MMHG | OXYGEN SATURATION: 97 % | HEART RATE: 66 BPM | BODY MASS INDEX: 23.79 KG/M2 | SYSTOLIC BLOOD PRESSURE: 136 MMHG

## 2022-02-14 DIAGNOSIS — Z95.1 S/P CABG X 2: Primary | ICD-10-CM

## 2022-02-14 DIAGNOSIS — I71.40 ABDOMINAL AORTIC ANEURYSM (AAA) WITHOUT RUPTURE: ICD-10-CM

## 2022-02-14 DIAGNOSIS — I10 PRIMARY HYPERTENSION: ICD-10-CM

## 2022-02-14 DIAGNOSIS — I25.10 CAD IN NATIVE ARTERY: ICD-10-CM

## 2022-02-14 PROCEDURE — G8427 DOCREV CUR MEDS BY ELIG CLIN: HCPCS | Performed by: INTERNAL MEDICINE

## 2022-02-14 PROCEDURE — 99214 OFFICE O/P EST MOD 30 MIN: CPT | Performed by: INTERNAL MEDICINE

## 2022-02-14 PROCEDURE — 1123F ACP DISCUSS/DSCN MKR DOCD: CPT | Performed by: INTERNAL MEDICINE

## 2022-02-14 PROCEDURE — G8484 FLU IMMUNIZE NO ADMIN: HCPCS | Performed by: INTERNAL MEDICINE

## 2022-02-14 PROCEDURE — 4040F PNEUMOC VAC/ADMIN/RCVD: CPT | Performed by: INTERNAL MEDICINE

## 2022-02-14 PROCEDURE — G8420 CALC BMI NORM PARAMETERS: HCPCS | Performed by: INTERNAL MEDICINE

## 2022-02-14 PROCEDURE — 1036F TOBACCO NON-USER: CPT | Performed by: INTERNAL MEDICINE

## 2022-02-14 RX ORDER — METOPROLOL SUCCINATE 25 MG/1
TABLET, EXTENDED RELEASE ORAL
Qty: 45 TABLET | Refills: 5 | Status: SHIPPED | OUTPATIENT
Start: 2022-02-14

## 2022-02-14 NOTE — PROGRESS NOTES
[facility]  Cardiology Progress Note      Selene Stone  1935, 81 y.o.      CC: No cardiac complaints. Danish Baker, DO:      HPI:   This is a 81 y.o. male who came for a stress test to the hospital because of epigastric burning. The stress test was markedly abnormal.  He then underwent coronary angiography which showed critical ostial left main disease. He underwent emergency CABGx2 on 5/9/18 LIMA-LAD, reserve SVG-OM1, IABP. Extubated postop day one. Had transient heart block on telemetry monitoring with pacing wires working on demand. Received COVID-19 vaccination. Returns for routine follow up and management of CAD. Has been well with no new cardiac complaints. 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 He reports taking all medication and tolerating. Today, he denies any chest pain, pressure, tightness, nausea, vomiting, diaphoresis, SOB at rest / GLEASON, palpitations, heart racing, dizziness/lightheadedness, cough, orthopnea, PND, LE edema, syncope or changes in bowel/bladder habits. Past Medical History:   Diagnosis Date    AAA (abdominal aortic aneurysm) ([facility] [address] 05/09/2018    3.8 cm    Coronary artery disease 05/09/2018    s/p CABG    Macular degeneration     Thrombophlebitis 2016    left lower extremity      Past Surgical History:   Procedure Laterality Date    CARDIAC CATHETERIZATION  05/09/2018    Dr. Michael Fischer. David - w/placement of IABP    COLONOSCOPY  11/2018    Dr. Leyla Peres N/A 11/12/2018    COLONOSCOPY POLYPECTOMY SNARE/COLD BIOPSY performed by Alicia Bowens MD at [address] N/A 11/12/2018    COLONOSCOPY CONTROL HEMORRHAGE performed by Alicia Bowens MD at [facility]  05/09/2018    Dr. Jenny Goltz - emergent x2 (LIMA-LAD, reverse R SVG-OM1), removal of IABP, placement of temporary pacing wire    PRE-MALIGNANT / BENIGN SKIN LESION EXCISION  11/11/2008    back     TRANSESOPHAGEAL ECHOCARDIOGRAM  2018    during emergent CABG      Family History   Problem Relation Age of Onset    Breast Cancer Sister     Other Brother         AAA      Social History     Tobacco Use    Smoking status: Former Smoker     Packs/day: 1.00     Years: 50.00     Pack years: 50.00     Types: Cigarettes     Start date: 81     Quit date: 2008     Years since quittin.1    Smokeless tobacco: Never Used    Tobacco comment: counseled on tobacco exposure avoidance   Vaping Use    Vaping Use: Never used   Substance Use Topics    Alcohol use: Yes     Alcohol/week: 1.0 standard drink     Types: 1 Standard drinks or equivalent per week     Comment: once per week    Drug use: No     No Known Allergies      Review of Systems -   Constitutional: Negative for weight gain/loss; malaise, fever  Respiratory: Negative for Asthma;  cough and hemoptysis  Cardiovascular: Negative for palpitations,dizziness   Gastrointestinal: Negative for abd.pain; constipation/diarrhea;    Genitourinary: Negative for stones; hematuria; frequency hesitancy  Integumentt: Negative for rash or pruritis  Hematologic/lymphatic: Negative for blood dyscrasia; leukemia/lymphoma  Musculoskeletal: Negative for Connective tissue disease  Neurological:  Negative for Seizure   Behavioral/Psych:Negative for Bipolar disorder, Schizophrenia; Dementia  Endocrine: negative for thyroid, parathyroid disease    Physical Examination:    /72   Pulse 66   Ht 5' 8\" (1.727 m)   Wt 157 lb (71.2 kg)   SpO2 97%   BMI 23.87 kg/m²    HEENT:  Face: Atraumatic, Conjunctiva: Pink; non icteric,  Mucous Memb:  Moist, No thyromegaly or Lymphadenopathy  Respiratory:  Resp Assessment: WNL, Resp Auscultation: Clear  Cardiovascular: Auscultation: nl S1 & S2, Palpation:  Nl PMI;  No heaves or thrills, JVP:  normal  Abdomen: Soft, non-tender, Normal bowel sounds,  No organomegaly  Extremities: No Cyanosis or Clubbing  Neurological: Oriented to time, place, and person, Non-anxious  Psychiatric: Normal mood and affect  Skin: Warm and dry,  No rash seen     Outpatient Medications Marked as Taking for the 22 encounter (Office Visit) with Javan Foster MD   Medication Sig Dispense Refill    metoprolol succinate (TOPROL XL) 25 MG extended release tablet TAKE 1/2 TABLET EVERY DAY 45 tablet 5    lisinopril (PRINIVIL;ZESTRIL) 10 MG tablet Take 1 tablet by mouth daily 90 tablet 1    atorvastatin (LIPITOR) 20 MG tablet TAKE 1 TABLET EVERY NIGHT 90 tablet 5    aspirin 81 MG EC tablet Take 1 tablet by mouth daily 90 tablet 5       Labs:   Lab Results   Component Value Date    HDL 61 10/28/2021    HDL 57 01/10/2012    LDLCALC 58 10/28/2021    TRIG 48 10/28/2021       EK21, sinus rhythm   22, NSR            Chest X-Ray: 18     FINDINGS:   Jugular central venous introducer sheath remains in place on the right side. The heart size is stable.  Bibasilar volume loss persists.  Bilateral pleural   effusions persist.  No new airspace disease.           ECHO: none on file     Stress Test: 18  Summary    Treadmill MPI Results        1. Technically a satisfactory study.    2. Positive treadmill exercise stress portion of the study.    3. Uniform distribution with no perfusion defect in stress images. There is    Transient ischemic dilitation. This may represent severe ischemia.    4. Gated Study shows normal LV size and Systolic function; EF is 46%.    5. The patient exercised for 4.30 min. on the Clarke protocol to achieve a HR    of 110, which is 79 % of PMHR. The study was stopped due to exercise induced    shortness of breath. There was no chest pain .  ECG showed ST elevation in    precordial leads and widespread ST depression during the recovery phase.    This took 12 min to return to normal.     Peak HR:110 bpm                                 HR/BP product:06888    Peak BP:151/72 mmHg                             Max exercise: 7 METS  Predicted HR: 139 bpm    % of predicted HR: 79    Test duration:4 min and 30 sec    Reason for termination:Physiologic Maximum        ECG Findings    Strongly positive stress test with ST elevation in precordial leads and    diffuse ST depressions       Corornary angiogram  & Intervention: 5/9/18  1.  The left main has ostial 95% lesion and the LAD and the circumflex appear to have no obstructive disease.  Only one picture was taken. 2.  The right coronary artery is a dominant vessel and is free of disease. CABGX2: 5/9/18  LIMA-LAD, reserve SVG-OM1,    Aorta Iliac Duplex Study: 7/2020     Unilateral ImpressionBilobed fusiform abdominal aortic aneurysm with maximum   diameter of 3.51 x 3.42 cm. 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           Aorto-Iliac aneurysms with dimensions as noted above    Normal aorto-iliac blood flow    No increase in diameters when compared to previous studies         ASSESSMENT AND PLAN:    CAD/CABGx2  LIMA-LAD, reserve SVG-OM. Continue ASA, Statin and B-blocker  LDL 58 (10/28/21)    Essential Hypertension  BP is stable   Risk factor modification. Abdominal Aortic Aneurysm  Without rupture. 3.8 cm in diameter  5/9/18.  3.5 x 3.42 cm 7/9/20    Repeat labs before follow up. Follow up yearly    Thank you very much for allowing me to participate in the care of your patient. Please do not hesitate to contact me if you have any questions. Sincerely,    Veronika Rodriguez M.D  [facility], [facility] [place] [address]  Ph: [phone]  Fax: [phone]    This note was scribed in the presence of Dr. Veronika Rodriguez MD by Lizeth [place]  Physician Attestation:  The scribes documentation has been prepared under my direction and personally reviewed by me in its entirety.      I confirm that the note above accurately reflects all work, treatment, procedures, and medical decision making performed by me.

## 2022-02-14 NOTE — PATIENT INSTRUCTIONS
Patient Education        A Healthy Heart: Care Instructions  Your Care Instructions     Coronary artery disease, also called heart disease, occurs when a substance called plaque builds up in the vessels that supply oxygen-rich blood to your heart muscle. This can narrow the blood vessels and reduce blood flow. A heart attack happens when blood flow is completely blocked. A high-fat diet, smoking, and other factors increase the risk of heart disease. Your doctor has found that you have a chance of having heart disease. You can do lots of things to keep your heart healthy. It may not be easy, but you can change your diet, exercise more, and quit smoking. These steps really work to lower your chance of heart disease. Follow-up care is a key part of your treatment and safety. Be sure to make and go to all appointments, and call your doctor if you are having problems. It's also a good idea to know your test results and keep a list of the medicines you take. How can you care for yourself at home? Diet    · Use less salt when you cook and eat. This helps lower your blood pressure. Taste food before salting. Add only a little salt when you think you need it. With time, your taste buds will adjust to less salt.     · Eat fewer snack items, fast foods, canned soups, and other high-salt, high-fat, processed foods.     · Read food labels and try to avoid saturated and trans fats. They increase your risk of heart disease by raising cholesterol levels.     · Limit the amount of solid fat-butter, margarine, and shortening-you eat. Use olive, peanut, or canola oil when you cook. Bake, broil, and steam foods instead of frying them.     · Eat a variety of fruit and vegetables every day. Dark green, deep orange, red, or yellow fruits and vegetables are especially good for you.  Examples include spinach, carrots, peaches, and berries.     · Foods high in fiber can reduce your cholesterol and provide important vitamins and tell you to chew 1 adult-strength or 2 to 4 low-dose aspirin. Wait for an ambulance. Do not try to drive yourself. Watch closely for changes in your health, and be sure to contact your doctor if you have any problems. Where can you learn more? Go to https://[email or web address].[email or web address]. org and sign in to your [employer] account. Enter W833 in the Jaeger box to learn more about \"A Healthy Heart: Care Instructions. \"     If you do not have an account, please click on the \"Sign Up Now\" link. Current as of: April 29, 2021               Content Version: 13.1  © 3324-0541 [employer]. Care instructions adapted under license by [employer]. If you have questions about a medical condition or this instruction, always ask your healthcare professional. [address] any warranty or liability for your use of this information.

## 2022-02-15 PROCEDURE — 93000 ELECTROCARDIOGRAM COMPLETE: CPT | Performed by: INTERNAL MEDICINE

## 2022-03-28 DIAGNOSIS — Z95.1 S/P CABG X 2: ICD-10-CM

## 2022-03-28 RX ORDER — ATORVASTATIN CALCIUM 20 MG/1
TABLET, FILM COATED ORAL
Qty: 90 TABLET | Refills: 2 | Status: SHIPPED | OUTPATIENT
Start: 2022-03-28

## 2022-03-28 NOTE — TELEPHONE ENCOUNTER
Last OV: 2/14/22  Next OV: 1 yr f/u 2/2023  Last refill:2/22/21  Most recent Labs: 10/28/21  Last EKG (if needed):2/15/22

## 2022-05-16 RX ORDER — LISINOPRIL 10 MG/1
TABLET ORAL
Qty: 90 TABLET | Refills: 1 | Status: SHIPPED | OUTPATIENT
Start: 2022-05-16

## 2022-05-16 NOTE — TELEPHONE ENCOUNTER
Last visit 10/28/21  Future Appointments   Date Time Provider Theo Macias   6[facility]30[facility]2022 10[facility]00 [facility]

## 2022-07-21 ENCOUNTER — TELEPHONE (OUTPATIENT)
Dept: FAMILY MEDICINE CLINIC | Age: 87
End: 2022-07-21

## 2022-07-21 DIAGNOSIS — L98.9 SKIN LESION ON EXAMINATION: Primary | ICD-10-CM

## 2022-07-21 NOTE — TELEPHONE ENCOUNTER
Was referred to derm in back of our building- their office does not take pt's insurance. Was then referred to Dr Sathish Espinal- their office only sees pt's if they were referred by a [facility] doctor, so unable to schedule there. Calling to find out if any other suggestions for dermatology referral. Please advise.  Please call back at [phone]

## 2022-08-12 ENCOUNTER — TELEMEDICINE (OUTPATIENT)
Dept: FAMILY MEDICINE CLINIC | Age: 87
End: 2022-08-12
Payer: MEDICARE

## 2022-08-12 ENCOUNTER — TELEPHONE (OUTPATIENT)
Dept: FAMILY MEDICINE CLINIC | Age: 87
End: 2022-08-12

## 2022-08-12 DIAGNOSIS — U07.1 COVID: Primary | ICD-10-CM

## 2022-08-12 PROCEDURE — 1123F ACP DISCUSS/DSCN MKR DOCD: CPT | Performed by: FAMILY MEDICINE

## 2022-08-12 PROCEDURE — 99213 OFFICE O/P EST LOW 20 MIN: CPT | Performed by: FAMILY MEDICINE

## 2022-08-12 NOTE — TELEPHONE ENCOUNTER
Patient calls complaining of covid symptoms or is covid positive    Onset: tuesday    Covid test- Yes , positive  If so, when? Last night    Do you have any of the following symptoms? [] Muscle or body aches  []Headache  [x]Congestion or runny nose  []New rash  [x]Sore throat  []Nausea or vomiting  []Diarrhea  []New loss of taste or smell  [x]Fatigue  [x]Cough   []Fever   []  []    Otc: No  If so, What?     Pharmacy:    [address] [place] [address]

## 2022-08-12 NOTE — TELEPHONE ENCOUNTER
Lab Results   Component Value Date/Time     10/28/2021 02:11 PM    K 4.6 10/28/2021 02:11 PM    K 4.7 11/23/2018 11:11 AM     10/28/2021 02:11 PM    CO2 24 10/28/2021 02:11 PM    BUN 13 10/28/2021 02:11 PM    CREATININE 0.7 10/28/2021 02:11 PM    GLUCOSE 89 10/28/2021 02:11 PM    CALCIUM 9.5 10/28/2021 02:11 PM

## 2022-08-12 NOTE — TELEPHONE ENCOUNTER
Faisal Perez from [facility] called wanting to know if Pt's kidneys have been checked in the last year, and if so was it normal. Faisal Perez is wanting to know the renal function because of the Paxlovid being called in for Pt.  Faisal Perez is reachable at [phone]

## 2022-08-12 NOTE — TELEPHONE ENCOUNTER
Spoke with Dr Maxwell Del Valle creatinine was 0.7 is fine with Pt getting dose checked within the year. Pharmacy notified at this time.

## 2022-08-15 NOTE — PROGRESS NOTES
with patient's (and/or legal guardian's) consent, to reduce the patient's risk of exposure to COVID-19 and provide necessary medical care. The patient (and/or legal guardian) has also been advised to contact this office for worsening conditions or problems, and seek emergency medical treatment and/or call 911 if deemed necessary. Patient identification was verified at the start of the visit: Yes    Total time spent on this encounter: Not billed by time    Services were provided through a video synchronous discussion virtually to substitute for in-person clinic visit. Patient and provider were located at their individual homes. --Beronica Granda MD on 8/15/2022 at 2:30 PM    An electronic signature was used to authenticate this note.

## 2022-09-01 ENCOUNTER — OFFICE VISIT (OUTPATIENT)
Dept: FAMILY MEDICINE CLINIC | Age: 87
End: 2022-09-01
Payer: MEDICARE

## 2022-09-01 VITALS
OXYGEN SATURATION: 98 % | DIASTOLIC BLOOD PRESSURE: 68 MMHG | BODY MASS INDEX: 23.7 KG/M2 | HEIGHT: 67 IN | TEMPERATURE: 98.1 F | SYSTOLIC BLOOD PRESSURE: 126 MMHG | HEART RATE: 63 BPM | WEIGHT: 151 LBS

## 2022-09-01 DIAGNOSIS — I10 PRIMARY HYPERTENSION: ICD-10-CM

## 2022-09-01 DIAGNOSIS — Z00.00 MEDICARE ANNUAL WELLNESS VISIT, SUBSEQUENT: Primary | ICD-10-CM

## 2022-09-01 DIAGNOSIS — I25.10 CORONARY ARTERY DISEASE INVOLVING NATIVE CORONARY ARTERY WITHOUT ANGINA PECTORIS, UNSPECIFIED WHETHER NATIVE OR TRANSPLANTED HEART: ICD-10-CM

## 2022-09-01 LAB
A/G RATIO: 1.8 (ref 1.1–2.2)
ALBUMIN SERPL-MCNC: 4 G/DL (ref 3.4–5)
ALP BLD-CCNC: 66 U/L (ref 40–129)
ALT SERPL-CCNC: 15 U/L (ref 10–40)
ANION GAP SERPL CALCULATED.3IONS-SCNC: 10 MMOL/L (ref 3–16)
AST SERPL-CCNC: 18 U/L (ref 15–37)
BASOPHILS ABSOLUTE: 0 K/UL (ref 0–0.2)
BASOPHILS RELATIVE PERCENT: 0.5 %
BILIRUB SERPL-MCNC: 0.5 MG/DL (ref 0–1)
BUN BLDV-MCNC: 15 MG/DL (ref 7–20)
CALCIUM SERPL-MCNC: 9 MG/DL (ref 8.3–10.6)
CHLORIDE BLD-SCNC: 104 MMOL/L (ref 99–110)
CO2: 25 MMOL/L (ref 21–32)
CREAT SERPL-MCNC: 0.7 MG/DL (ref 0.8–1.3)
EOSINOPHILS ABSOLUTE: 0.1 K/UL (ref 0–0.6)
EOSINOPHILS RELATIVE PERCENT: 1.3 %
GFR AFRICAN AMERICAN: >60
GFR NON-AFRICAN AMERICAN: >60
GLUCOSE BLD-MCNC: 102 MG/DL (ref 70–99)
HCT VFR BLD CALC: 41 % (ref 40.5–52.5)
HEMOGLOBIN: 13.9 G/DL (ref 13.5–17.5)
LDL CHOLESTEROL DIRECT: 60 MG/DL
LYMPHOCYTES ABSOLUTE: 1.1 K/UL (ref 1–5.1)
LYMPHOCYTES RELATIVE PERCENT: 24.3 %
MCH RBC QN AUTO: 31.7 PG (ref 26–34)
MCHC RBC AUTO-ENTMCNC: 33.9 G/DL (ref 31–36)
MCV RBC AUTO: 93.5 FL (ref 80–100)
MONOCYTES ABSOLUTE: 0.4 K/UL (ref 0–1.3)
MONOCYTES RELATIVE PERCENT: 9 %
NEUTROPHILS ABSOLUTE: 3 K/UL (ref 1.7–7.7)
NEUTROPHILS RELATIVE PERCENT: 64.9 %
PDW BLD-RTO: 14.2 % (ref 12.4–15.4)
PLATELET # BLD: 210 K/UL (ref 135–450)
PMV BLD AUTO: 9.3 FL (ref 5–10.5)
POTASSIUM SERPL-SCNC: 5.1 MMOL/L (ref 3.5–5.1)
RBC # BLD: 4.38 M/UL (ref 4.2–5.9)
SODIUM BLD-SCNC: 139 MMOL/L (ref 136–145)
TOTAL PROTEIN: 6.2 G/DL (ref 6.4–8.2)
TSH REFLEX: 1 UIU/ML (ref 0.27–4.2)
WBC # BLD: 4.6 K/UL (ref 4–11)

## 2022-09-01 PROCEDURE — 1123F ACP DISCUSS/DSCN MKR DOCD: CPT | Performed by: FAMILY MEDICINE

## 2022-09-01 PROCEDURE — 36415 COLL VENOUS BLD VENIPUNCTURE: CPT | Performed by: FAMILY MEDICINE

## 2022-09-01 PROCEDURE — G0439 PPPS, SUBSEQ VISIT: HCPCS | Performed by: FAMILY MEDICINE

## 2022-09-01 SDOH — ECONOMIC STABILITY: FOOD INSECURITY: WITHIN THE PAST 12 MONTHS, YOU WORRIED THAT YOUR FOOD WOULD RUN OUT BEFORE YOU GOT MONEY TO BUY MORE.: NEVER TRUE

## 2022-09-01 SDOH — ECONOMIC STABILITY: FOOD INSECURITY: WITHIN THE PAST 12 MONTHS, THE FOOD YOU BOUGHT JUST DIDN'T LAST AND YOU DIDN'T HAVE MONEY TO GET MORE.: NEVER TRUE

## 2022-09-01 ASSESSMENT — SOCIAL DETERMINANTS OF HEALTH (SDOH): HOW HARD IS IT FOR YOU TO PAY FOR THE VERY BASICS LIKE FOOD, HOUSING, MEDICAL CARE, AND HEATING?: NOT HARD AT ALL

## 2022-09-01 ASSESSMENT — PATIENT HEALTH QUESTIONNAIRE - PHQ9
SUM OF ALL RESPONSES TO PHQ9 QUESTIONS 1 & 2: 0
SUM OF ALL RESPONSES TO PHQ QUESTIONS 1-9: 0
2. FEELING DOWN, DEPRESSED OR HOPELESS: 0
1. LITTLE INTEREST OR PLEASURE IN DOING THINGS: 0
SUM OF ALL RESPONSES TO PHQ QUESTIONS 1-9: 0

## 2022-09-01 ASSESSMENT — LIFESTYLE VARIABLES
HOW OFTEN DO YOU HAVE A DRINK CONTAINING ALCOHOL: 2-4 TIMES A MONTH
HOW MANY STANDARD DRINKS CONTAINING ALCOHOL DO YOU HAVE ON A TYPICAL DAY: 1 OR 2

## 2022-09-01 NOTE — PROGRESS NOTES
Medicare Annual Wellness Visit    Puneet Amado is here for Medicare AWV (No concerns)    Assessment & Plan   Medicare annual wellness visit, subsequent    Primary hypertension, controlled  -     CBC with Auto Differential  -     TSH with Reflex  -     Comprehensive Metabolic Panel  Continue with current medication management    Coronary artery disease involving native coronary artery without angina pectoris, unspecified whether native or transplanted heart, asymptomatic  -     LDL Cholesterol, Direct, continue statin, continue following with Dr. Mariel Rojo        Recommendations for Preventive Services Due: see orders and patient instructions/AVS.  Recommended screening schedule for the next 5-10 years is provided to the patient in written form: see Patient Instructions/AVS.     Patient to consider shingles vaccine    Return in about 6 months (around 3/1/2023) for med. Subjective     Gen- NAD, pleasant  HEENT- Eyes without icterus or injection  Neck- Supple, no lymphadenopathy appreciated  Lungs- CTAB  Heart- RRR  Abd- Soft, non tender  Ext- No edema  Psych- Appropriate  Neuro- No focal deficits appreciated    Patient's complete Health Risk Assessment and screening values have been reviewed and are found in Flowsheets. The following problems were reviewed today and where indicated follow up appointments were made and/or referrals ordered. Positive Risk Factor Screenings with Interventions:     Cognitive:   Words recalled: 0 Words Recalled  Clock Drawing Test (CDT): Normal  Total Score Interpretation: Abnormal Mini-Cog  Did the patient refuse to take the cognition test?: No  Cognitive Impairment Interventions:    Patient reports he is doing well mood and memory wise, he has no concerns, he is not interested in any intervention                 Hearing/Vision:  Do you or your family notice any trouble with your hearing that hasn't been managed with hearing aids?: (!) Yes, consider audiology eval  Do you have difficulty driving, watching TV, or doing any of your daily activities because of your eyesight?: No  Have you had an eye exam within the past year?: Yes      General:  Importance of advanced directives discussed  Recommended removing any fall hazards  Continued healthy eating, exercise recommended  Patient reports she has all she needs and is able to do ADLs without any issue          Objective   Vitals:    09/01/22 0959   BP: 126/68   Pulse: 63   Temp: 98.1 °F (36.7 °C)   SpO2: 98%   Weight: 151 lb (68.5 kg)   Height: 5' 6.5\" (1.689 m)      Body mass index is 24.01 kg/m². No Known Allergies  Prior to Visit Medications    Medication Sig Taking?  Authorizing Provider   apixaban (ELIQUIS) 2.5 MG TABS tablet Take 2.5 mg by mouth 2 times daily Yes Historical Provider, MD   lisinopril (PRINIVIL;ZESTRIL) 10 MG tablet TAKE 1 TABLET EVERY DAY Yes Mellisa Vickers DO   atorvastatin (LIPITOR) 20 MG tablet TAKE 1 TABLET EVERY NIGHT Yes Souleymane Chinchilla MD   metoprolol succinate (TOPROL XL) 25 MG extended release tablet TAKE 1/2 TABLET EVERY DAY Yes Souleymane Chinchilla MD   aspirin 81 MG EC tablet Take 1 tablet by mouth daily Yes Souleymane Chinchilla MD       [facility] (Including outside providers/suppliers regularly involved in providing care):   Patient Care Team:  Jane Howe DO as PCP - General (Family Medicine)  Jane Howe DO as PCP - [facility] Empaneled Provider  Souleymane Chinchilla MD as Consulting Physician (Cardiology)     Reviewed and updated this visit:  Tobacco  Allergies  Meds  Med Hx  Surg Hx  Soc Hx  Fam Hx

## 2022-09-06 ENCOUNTER — TELEPHONE (OUTPATIENT)
Dept: FAMILY MEDICINE CLINIC | Age: 87
End: 2022-09-06

## 2022-11-20 DIAGNOSIS — Z95.1 S/P CABG X 2: ICD-10-CM

## 2022-11-23 RX ORDER — ATORVASTATIN CALCIUM 20 MG/1
TABLET, FILM COATED ORAL
Qty: 90 TABLET | Refills: 1 | Status: SHIPPED | OUTPATIENT
Start: 2022-11-23

## 2023-03-01 NOTE — PROGRESS NOTES
[address]  Cardiology Progress Note      Ezra Party  1935, 81 y.o. No chief complaint on file. Jason Schaefer, DO:      HPI:   This is a 81 y.o. male who came for a stress test to the hospital because of epigastric burning. The stress test was markedly abnormal.  He then underwent coronary angiography which showed critical ostial left main disease. He underwent emergency CABGx2 on 5/9/18 LIMA-LAD, reserve SVG-OM1, IABP. Extubated postop day one. Had transient heart block on telemetry monitoring with pacing wires working on demand. Received COVID-19 vaccination. Returns for routine follow up and management of CAD. Has been well with no new cardiac complaints. 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 He reports taking all medication and tolerating. Today, he denies any chest pain, pressure, tightness, nausea, vomiting, diaphoresis, SOB at rest / GLEASON, palpitations, heart racing, dizziness/lightheadedness, cough, orthopnea, PND, LE edema, syncope or changes in bowel/bladder habits. Past Medical History:   Diagnosis Date    AAA (abdominal aortic aneurysm) ([facility] [address] 05/09/2018    3.8 cm    Coronary artery disease 05/09/2018    s/p CABG    COVID-19     Macular degeneration     Thrombophlebitis 2016    left lower extremity      Past Surgical History:   Procedure Laterality Date    CARDIAC CATHETERIZATION  05/09/2018    Dr. Pete White. [place] - w/placement of IABP    COLONOSCOPY  11/2018    Dr. Mota Catching    COLONOSCOPY N/A 11/12/2018    COLONOSCOPY POLYPECTOMY SNARE/COLD BIOPSY performed by Katelynn Bustos MD at [address] N/A 11/12/2018    COLONOSCOPY CONTROL HEMORRHAGE performed by Katelynn Bustos MD at [facility]  05/09/2018    Dr. Karan Bond - emergent x2 (LIMA-LAD, reverse R SVG-OM1), removal of IABP, placement of temporary pacing wire    PRE-MALIGNANT / BENIGN SKIN LESION EXCISION  11/11/2008    back TRANSESOPHAGEAL ECHOCARDIOGRAM  05/09/2018    during emergent CABG      Family History   Problem Relation Age of Onset    Breast Cancer Sister     Other Brother         AAA      Social History     Tobacco Use    Smoking status: Former     Packs/day: 1.00     Years: 50.00     Pack years: 50.00     Types: Cigarettes     Start date: 81     Quit date: 1/1/2008     Years since quitting: 15.1    Smokeless tobacco: Never    Tobacco comments:     counseled on tobacco exposure avoidance   Vaping Use    Vaping Use: Never used   Substance Use Topics    Alcohol use: Yes     Alcohol/week: 1.0 standard drink     Types: 1 Standard drinks or equivalent per week     Comment: once per week    Drug use: No     No Known Allergies      Review of Systems -   Constitutional: Negative for weight gain/loss; malaise, fever  Respiratory: Negative for Asthma;  cough and hemoptysis  Cardiovascular: Negative for palpitations,dizziness   Gastrointestinal: Negative for abd.pain; constipation/diarrhea;    Genitourinary: Negative for stones; hematuria; frequency hesitancy  Integumentt: Negative for rash or pruritis  Hematologic/lymphatic: Negative for blood dyscrasia; leukemia/lymphoma  Musculoskeletal: Negative for Connective tissue disease  Neurological:  Negative for Seizure   Behavioral/Psych:Negative for Bipolar disorder, Schizophrenia; Dementia  Endocrine: negative for thyroid, parathyroid disease    Physical Examination:    There were no vitals taken for this visit. HEENT:  Face: Atraumatic, Conjunctiva: Pink; non icteric,  Mucous Memb:  Moist, No thyromegaly or Lymphadenopathy  Respiratory:  Resp Assessment: WNL, Resp Auscultation: Clear  Cardiovascular: Auscultation: nl S1 & S2, Palpation:  Nl PMI;  No heaves or thrills, JVP:  normal  Abdomen: Soft, non-tender, Normal bowel sounds,  No organomegaly  Extremities: No Cyanosis or Clubbing  Neurological: Oriented to time, place, and person, Non-anxious  Psychiatric: Normal mood and affect  Skin: Warm and dry,  No rash seen     No outpatient medications have been marked as taking for the 3/2/23 encounter (Appointment) with Kaitlin Miramontes MD.       Labs:   Lab Results   Component Value Date/Time    HDL 61 10/28/2021 02:11 PM    HDL 57 01/10/2012 08:30 AM    LDLDIRECT 60 2022 10:44 AM    LDLCALC 58 10/28/2021 02:11 PM    TRIG 48 10/28/2021 02:11 PM       EK21, sinus rhythm   22, NSR            Chest X-Ray: 18     FINDINGS:   Jugular central venous introducer sheath remains in place on the right side. The heart size is stable. Bibasilar volume loss persists. Bilateral pleural   effusions persist.  No new airspace disease. ECHO: none on file     Stress Test: 18  Summary    Treadmill MPI Results        1. Technically a satisfactory study. 2. Positive treadmill exercise stress portion of the study. 3. Uniform distribution with no perfusion defect in stress images. There is    Transient ischemic dilitation. This may represent severe ischemia. 4. Gated Study shows normal LV size and Systolic function; EF is 11%. 5. The patient exercised for 4.30 min. on the Clarke protocol to achieve a HR    of 110, which is 79 % of PMHR. The study was stopped due to exercise induced    shortness of breath. There was no chest pain . ECG showed ST elevation in    precordial leads and widespread ST depression during the recovery phase. This took 12 min to return to normal.     Peak HR:110 bpm                                 HR/BP product:52648    Peak BP:151/72 mmHg                             Max exercise: 7 METS    Predicted HR: 139 bpm    % of predicted HR: 79    Test duration:4 min and 30 sec    Reason for termination:Physiologic Maximum        ECG Findings    Strongly positive stress test with ST elevation in precordial leads and    diffuse ST depressions       Corornary angiogram  & Intervention: 18  1.   The left main has ostial 95% lesion and the LAD and the circumflex appear to have no obstructive disease. Only one picture was taken. 2.  The right coronary artery is a dominant vessel and is free of disease. CABGX2: 5/9/18  LIMA-LAD, reserve SVG-OM1,    Aorta Iliac Duplex Study: 7/2020     Unilateral ImpressionBilobed fusiform abdominal aortic aneurysm with maximum   diameter of 3.51 x 3.42 cm. 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 Aorto-Iliac aneurysms with dimensions as noted above    Normal aorto-iliac blood flow    No increase in diameters when compared to previous studies         ASSESSMENT AND PLAN:    CAD/CABGx2  LIMA-LAD, reserve SVG-OM. Continue ASA, Statin and B-blocker  LDL 60 (09/01/22)    Essential Hypertension  Goal < 130/80  Stable  Risk factor modification. Abdominal Aortic Aneurysm  Without rupture. 3.8 cm in diameter  5/9/18.  3.5 x 3.42 cm 7/9/20      Follow up yearly    Thank you very much for allowing me to participate in the care of your patient. Please do not hesitate to contact me if you have any questions.     Sincerely,    Yumiko Kendall M.D  [place] [address], [facility] [place] [facility]  Ph: [phone]  Fax: [phone]    This note was scribed in the presence of Dr. Yumiko Kendall MD by Frederick Holguin LPN

## 2023-03-02 ENCOUNTER — OFFICE VISIT (OUTPATIENT)
Dept: CARDIOLOGY CLINIC | Age: 88
End: 2023-03-02
Payer: MEDICARE

## 2023-03-02 VITALS
WEIGHT: 157 LBS | DIASTOLIC BLOOD PRESSURE: 64 MMHG | SYSTOLIC BLOOD PRESSURE: 122 MMHG | HEART RATE: 58 BPM | HEIGHT: 68 IN | BODY MASS INDEX: 23.79 KG/M2

## 2023-03-02 DIAGNOSIS — I10 PRIMARY HYPERTENSION: ICD-10-CM

## 2023-03-02 DIAGNOSIS — I71.40 ABDOMINAL AORTIC ANEURYSM (AAA) WITHOUT RUPTURE, UNSPECIFIED PART: ICD-10-CM

## 2023-03-02 DIAGNOSIS — Z95.1 S/P CABG X 2: ICD-10-CM

## 2023-03-02 DIAGNOSIS — I25.10 CAD IN NATIVE ARTERY: Primary | ICD-10-CM

## 2023-03-02 PROCEDURE — G8427 DOCREV CUR MEDS BY ELIG CLIN: HCPCS | Performed by: INTERNAL MEDICINE

## 2023-03-02 PROCEDURE — 99204 OFFICE O/P NEW MOD 45 MIN: CPT | Performed by: INTERNAL MEDICINE

## 2023-03-02 PROCEDURE — G8484 FLU IMMUNIZE NO ADMIN: HCPCS | Performed by: INTERNAL MEDICINE

## 2023-03-02 PROCEDURE — 93000 ELECTROCARDIOGRAM COMPLETE: CPT | Performed by: INTERNAL MEDICINE

## 2023-03-02 PROCEDURE — G8420 CALC BMI NORM PARAMETERS: HCPCS | Performed by: INTERNAL MEDICINE

## 2023-03-02 PROCEDURE — 1123F ACP DISCUSS/DSCN MKR DOCD: CPT | Performed by: INTERNAL MEDICINE

## 2023-03-02 PROCEDURE — 1036F TOBACCO NON-USER: CPT | Performed by: INTERNAL MEDICINE

## 2023-03-02 NOTE — PROGRESS NOTES
[address]  Cardiology Progress Note      Isabelle Arita  1935, 81 y.o. Chief Complaint   Patient presents with    Annual Exam     No cardiac symptoms. Raul Denises, DO:    CC: \"I feel fine\"     HPI: This is a 81 y.o. male who came for a stress test to the hospital because of epigastric burning. The stress test was markedly abnormal.  He then underwent coronary angiography which showed critical ostial left main disease. He underwent emergency CABGx2 on 5/9/18 LIMA-LAD, reserve SVG-OM1, IABP. Extubated postop day one. Had transient heart block on telemetry monitoring with pacing wires working on demand. Received COVID-19 vaccination. Today he presents for follow up and states that overall he is feeling well. He denies any new sounding cardiac complaints. He denies any chest pains or worsening shortness of breath. He reports medication compliance and is tolerating. He denies any abnormal bleeding or bruising. He denies exertional chest pain/pressure, dyspnea at rest, worsening GLEASON, PND, orthopnea, palpitations, lightheadedness, weight changes, changes in LE edema, and syncope. Past Medical History:   Diagnosis Date    AAA (abdominal aortic aneurysm) 05/09/2018    3.8 cm    Coronary artery disease 05/09/2018    s/p CABG    COVID-19     Macular degeneration     Thrombophlebitis 2016    left lower extremity      Past Surgical History:   Procedure Laterality Date    CARDIAC CATHETERIZATION  05/09/2018    Dr. Leodan Carlos. [place] - w/placement of IABP    COLONOSCOPY  11/2018    Dr. Emelina Rust    COLONOSCOPY N/A 11/12/2018    COLONOSCOPY POLYPECTOMY SNARE/COLD BIOPSY performed by Tiesha Piña MD at [address] [place] [address] N/A 11/12/2018    COLONOSCOPY CONTROL HEMORRHAGE performed by Tiesha Piña MD at [facility]  05/09/2018    Dr. Peace Jones - emergent x2 (LIMA-LAD, reverse R SVG-OM1), removal of IABP, placement of temporary pacing wire PRE-MALIGNANT / BENIGN SKIN LESION EXCISION  11/11/2008    back     TRANSESOPHAGEAL ECHOCARDIOGRAM  05/09/2018    during emergent CABG      Family History   Problem Relation Age of Onset    Breast Cancer Sister     Other Brother         AAA      Social History     Tobacco Use    Smoking status: Former     Packs/day: 1.00     Years: 50.00     Pack years: 50.00     Types: Cigarettes     Start date: 81     Quit date: 1/1/2008     Years since quitting: 15.1    Smokeless tobacco: Never    Tobacco comments:     counseled on tobacco exposure avoidance   Vaping Use    Vaping Use: Never used   Substance Use Topics    Alcohol use: Yes     Alcohol/week: 1.0 standard drink     Types: 1 Standard drinks or equivalent per week     Comment: once per week    Drug use: No     No Known Allergies      Review of Systems -   Constitutional: Negative for weight gain/loss; malaise, fever  Respiratory: Negative for Asthma;  cough and hemoptysis  Cardiovascular: Negative for palpitations,dizziness   Gastrointestinal: Negative for abd.pain; constipation/diarrhea;    Genitourinary: Negative for stones; hematuria; frequency hesitancy  Integumentt: Negative for rash or pruritis  Hematologic/lymphatic: Negative for blood dyscrasia; leukemia/lymphoma  Musculoskeletal: Negative for Connective tissue disease  Neurological:  Negative for Seizure   Behavioral/Psych:Negative for Bipolar disorder, Schizophrenia; Dementia  Endocrine: negative for thyroid, parathyroid disease    Physical Examination:    /64   Pulse 58   Ht 5' 8\" (1.727 m)   Wt 157 lb (71.2 kg)   PF 97 L/min   BMI 23.87 kg/m²    HEENT:  Face: Atraumatic, Conjunctiva: Pink; non icteric,  Mucous Memb:  Moist, No thyromegaly or Lymphadenopathy  Respiratory:  Resp Assessment: WNL, Resp Auscultation: Clear  Cardiovascular: Auscultation: nl S1 & S2, Palpation:  Nl PMI;  No heaves or thrills, JVP:  normal  Abdomen: Soft, non-tender, Normal bowel sounds,  No organomegaly  Extremities: No Cyanosis or Clubbing  Neurological: Oriented to time, place, and person, Non-anxious  Psychiatric: Normal mood and affect  Skin: Warm and dry,  No rash seen     Outpatient Medications Marked as Taking for the 3/2/23 encounter (Office Visit) with Dianne Brar MD   Medication Sig Dispense Refill    atorvastatin (LIPITOR) 20 MG tablet TAKE 1 TABLET EVERY NIGHT 90 tablet 1    lisinopril (PRINIVIL;ZESTRIL) 10 MG tablet TAKE 1 TABLET EVERY DAY 90 tablet 1    metoprolol succinate (TOPROL XL) 25 MG extended release tablet TAKE 1/2 TABLET EVERY DAY 45 tablet 5    aspirin 81 MG EC tablet Take 1 tablet by mouth daily 90 tablet 5       Labs:   Lab Results   Component Value Date/Time    HDL 61 10/28/2021 02:11 PM    HDL 57 01/10/2012 08:30 AM    LDLDIRECT 60 2022 10:44 AM    LDLCALC 58 10/28/2021 02:11 PM    TRIG 48 10/28/2021 02:11 PM       EK21, sinus rhythm   22, NSR  3/2/23: Sinus  Bradycardia. First degree A-V block  - frequent ectopic ventricular beats Omkar = 250. # VECs = 2. Right bundle branch block with left axis -bifascicular block. Chest X-Ray: 18     FINDINGS:   Jugular central venous introducer sheath remains in place on the right side. The heart size is stable. Bibasilar volume loss persists. Bilateral pleural   effusions persist.  No new airspace disease. ECHO: none on file     Stress Test: 18  Summary    Treadmill MPI Results        1. Technically a satisfactory study. 2. Positive treadmill exercise stress portion of the study. 3. Uniform distribution with no perfusion defect in stress images. There is    Transient ischemic dilitation. This may represent severe ischemia. 4. Gated Study shows normal LV size and Systolic function; EF is 78%. 5. The patient exercised for 4.30 min. on the Clarke protocol to achieve a HR    of 110, which is 79 % of PMHR. The study was stopped due to exercise induced    shortness of breath. There was no chest pain .  ECG showed ST elevation in    precordial leads and widespread ST depression during the recovery phase. This took 12 min to return to normal.     Peak HR:110 bpm                                 HR/BP product:77404    Peak BP:151/72 mmHg                             Max exercise: 7 METS    Predicted HR: 139 bpm    % of predicted HR: 79    Test duration:4 min and 30 sec    Reason for termination:Physiologic Maximum        ECG Findings    Strongly positive stress test with ST elevation in precordial leads and    diffuse ST depressions     Corornary angiogram  & Intervention: 5/9/18  1. The left main has ostial 95% lesion and the LAD and the circumflex appear to have no obstructive disease. Only one picture was taken. 2.  The right coronary artery is a dominant vessel and is free of disease. CABGX2: 5/9/18  LIMA-LAD, reserve SVG-OM1,    Aorta Iliac Duplex Study: 7/2020     Unilateral ImpressionBilobed fusiform abdominal aortic aneurysm with maximum   diameter of 3.51 x 3.42 cm. 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 Aorto-Iliac aneurysms with dimensions as noted above    Normal aorto-iliac blood flow    No increase in diameters when compared to previous studies       ASSESSMENT AND PLAN:    CAD/CABGx2  LIMA-LAD, reserve SVG-OM. Asymptomatic   Continue ASA, Statin and B-blocker  LDL 60 (09/01/22)    Essential Hypertension  Goal < 130/80  Stable  Risk factor modification. Abdominal Aortic Aneurysm  Without rupture. 3.8 cm in diameter  5/9/18.  3.5 x 3.42 cm 7/9/20    Follow up yearly    Thank you very much for allowing me to participate in the care of your patient. Please do not hesitate to contact me if you have any questions. Sincerely,    Bill Courtney M.D  [facility], [address] [place] [address]  Ph: [phone]  Fax: [phone]    This note was scribed in the presence of Dr Carlota Yuen MD by Kizzy Poole RN. Physician Attestation:  The scribes documentation has been prepared under my direction and personally reviewed by me in its entirety. I confirm that the note above accurately reflects all work, treatment, procedures, and medical decision making performed by me.

## 2023-03-15 DIAGNOSIS — Z95.1 S/P CABG X 2: ICD-10-CM

## 2023-03-15 NOTE — TELEPHONE ENCOUNTER
Medication:   Requested Prescriptions     Pending Prescriptions Disp Refills    lisinopril (PRINIVIL;ZESTRIL) 10 MG tablet [Pharmacy Med Name: LISINOPRIL 10 MG Tablet] 90 tablet 1     Sig: TAKE 1 TABLET EVERY DAY       Last Filled:      Patient Phone Number: [phone] (home)     Last appt: 9/1/2022   Next appt: Visit date not found

## 2023-03-16 RX ORDER — LISINOPRIL 10 MG/1
TABLET ORAL
Qty: 90 TABLET | Refills: 1 | Status: SHIPPED | OUTPATIENT
Start: 2023-03-16

## 2023-03-16 RX ORDER — METOPROLOL SUCCINATE 25 MG/1
TABLET, EXTENDED RELEASE ORAL
Qty: 45 TABLET | Refills: 5 | Status: SHIPPED | OUTPATIENT
Start: 2023-03-16

## 2023-03-16 NOTE — TELEPHONE ENCOUNTER
Last OV: 3/2/23   Next OV: not scheduled  Last refill: 2/14/22  Most recent Labs: CMP 9/1/22  Last EKG (if needed):

## 2023-09-21 DIAGNOSIS — Z95.1 S/P CABG X 2: ICD-10-CM

## 2023-09-21 RX ORDER — ATORVASTATIN CALCIUM 20 MG/1
TABLET, FILM COATED ORAL
Qty: 90 TABLET | Refills: 3 | Status: SHIPPED | OUTPATIENT
Start: 2023-09-21

## 2023-09-21 NOTE — TELEPHONE ENCOUNTER
Requested Prescriptions     Pending Prescriptions Disp Refills    atorvastatin (LIPITOR) 20 MG tablet [Pharmacy Med Name: ATORVASTATIN CALCIUM 20 MG Tablet] 90 tablet 3     Sig: TAKE 1 TABLET EVERY NIGHT          Number: 90    Refills: 3    Last Office Visit: 3/2/2023     Next Office Visit: None scheduled    Last Refill: 11/23/2023    Last Labs:  10/28/2021

## 2024-01-08 RX ORDER — LISINOPRIL 10 MG/1
TABLET ORAL
Qty: 90 TABLET | Refills: 0 | Status: SHIPPED | OUTPATIENT
Start: 2024-01-08

## 2024-01-08 NOTE — TELEPHONE ENCOUNTER
Medication:   Requested Prescriptions     Pending Prescriptions Disp Refills    lisinopril (PRINIVIL;ZESTRIL) 10 MG tablet [Pharmacy Med Name: LISINOPRIL 10 MG Tablet] 90 tablet 0     Sig: TAKE 1 TABLET EVERY DAY        Last Filled:  03/16/2023    Patient Phone Number: [phone] (home)     Last appt: 9/1/2022   Next appt: 2/29/2024    Last OARRS:        No data to display

## 2024-02-29 ENCOUNTER — OFFICE VISIT (OUTPATIENT)
Dept: FAMILY MEDICINE CLINIC | Age: 89
End: 2024-02-29

## 2024-02-29 VITALS
HEIGHT: 68 IN | DIASTOLIC BLOOD PRESSURE: 60 MMHG | WEIGHT: 156.6 LBS | BODY MASS INDEX: 23.73 KG/M2 | SYSTOLIC BLOOD PRESSURE: 108 MMHG

## 2024-02-29 DIAGNOSIS — E78.49 OTHER HYPERLIPIDEMIA: ICD-10-CM

## 2024-02-29 DIAGNOSIS — I71.40 ABDOMINAL AORTIC ANEURYSM (AAA) WITHOUT RUPTURE, UNSPECIFIED PART (HCC): ICD-10-CM

## 2024-02-29 DIAGNOSIS — I10 PRIMARY HYPERTENSION: Primary | ICD-10-CM

## 2024-02-29 DIAGNOSIS — I51.9 HEART DISEASE: ICD-10-CM

## 2024-02-29 LAB
ALBUMIN SERPL-MCNC: 4.2 G/DL (ref 3.4–5)
ALBUMIN/GLOB SERPL: 1.7 {RATIO} (ref 1.1–2.2)
ALP SERPL-CCNC: 67 U/L (ref 40–129)
ALT SERPL-CCNC: 16 U/L (ref 10–40)
ANION GAP SERPL CALCULATED.3IONS-SCNC: 8 MMOL/L (ref 3–16)
AST SERPL-CCNC: 21 U/L (ref 15–37)
BASOPHILS # BLD: 0 K/UL (ref 0–0.2)
BASOPHILS NFR BLD: 0.4 %
BILIRUB SERPL-MCNC: 0.6 MG/DL (ref 0–1)
BUN SERPL-MCNC: 16 MG/DL (ref 7–20)
CALCIUM SERPL-MCNC: 9.1 MG/DL (ref 8.3–10.6)
CHLORIDE SERPL-SCNC: 104 MMOL/L (ref 99–110)
CO2 SERPL-SCNC: 28 MMOL/L (ref 21–32)
CREAT SERPL-MCNC: 1.1 MG/DL (ref 0.8–1.3)
DEPRECATED RDW RBC AUTO: 14.2 % (ref 12.4–15.4)
EOSINOPHIL # BLD: 0 K/UL (ref 0–0.6)
EOSINOPHIL NFR BLD: 0.9 %
GFR SERPLBLD CREATININE-BSD FMLA CKD-EPI: >60 ML/MIN/{1.73_M2}
GLUCOSE SERPL-MCNC: 89 MG/DL (ref 70–99)
HCT VFR BLD AUTO: 43 % (ref 40.5–52.5)
HGB BLD-MCNC: 14.8 G/DL (ref 13.5–17.5)
LDLC SERPL-MCNC: 45 MG/DL
LYMPHOCYTES # BLD: 1.2 K/UL (ref 1–5.1)
LYMPHOCYTES NFR BLD: 24.2 %
MCH RBC QN AUTO: 31.7 PG (ref 26–34)
MCHC RBC AUTO-ENTMCNC: 34.5 G/DL (ref 31–36)
MCV RBC AUTO: 92 FL (ref 80–100)
MONOCYTES # BLD: 0.4 K/UL (ref 0–1.3)
MONOCYTES NFR BLD: 8.2 %
NEUTROPHILS # BLD: 3.3 K/UL (ref 1.7–7.7)
NEUTROPHILS NFR BLD: 66.3 %
PLATELET # BLD AUTO: 161 K/UL (ref 135–450)
PMV BLD AUTO: 9.3 FL (ref 5–10.5)
POTASSIUM SERPL-SCNC: 4.6 MMOL/L (ref 3.5–5.1)
PROT SERPL-MCNC: 6.7 G/DL (ref 6.4–8.2)
RBC # BLD AUTO: 4.67 M/UL (ref 4.2–5.9)
SODIUM SERPL-SCNC: 140 MMOL/L (ref 136–145)
WBC # BLD AUTO: 5 K/UL (ref 4–11)

## 2024-02-29 SDOH — ECONOMIC STABILITY: FOOD INSECURITY: WITHIN THE PAST 12 MONTHS, THE FOOD YOU BOUGHT JUST DIDN'T LAST AND YOU DIDN'T HAVE MONEY TO GET MORE.: NEVER TRUE

## 2024-02-29 SDOH — ECONOMIC STABILITY: FOOD INSECURITY: WITHIN THE PAST 12 MONTHS, YOU WORRIED THAT YOUR FOOD WOULD RUN OUT BEFORE YOU GOT MONEY TO BUY MORE.: NEVER TRUE

## 2024-02-29 SDOH — ECONOMIC STABILITY: INCOME INSECURITY: HOW HARD IS IT FOR YOU TO PAY FOR THE VERY BASICS LIKE FOOD, HOUSING, MEDICAL CARE, AND HEATING?: NOT HARD AT ALL

## 2024-02-29 SDOH — ECONOMIC STABILITY: HOUSING INSECURITY
IN THE LAST 12 MONTHS, WAS THERE A TIME WHEN YOU DID NOT HAVE A STEADY PLACE TO SLEEP OR SLEPT IN A SHELTER (INCLUDING NOW)?: NO

## 2024-02-29 ASSESSMENT — PATIENT HEALTH QUESTIONNAIRE - PHQ9
1. LITTLE INTEREST OR PLEASURE IN DOING THINGS: 0
SUM OF ALL RESPONSES TO PHQ QUESTIONS 1-9: 0
SUM OF ALL RESPONSES TO PHQ9 QUESTIONS 1 & 2: 0
SUM OF ALL RESPONSES TO PHQ QUESTIONS 1-9: 0
2. FEELING DOWN, DEPRESSED OR HOPELESS: 0

## 2024-02-29 NOTE — PROGRESS NOTES
Diagnosis Orders   1. Primary hypertension        2. Other hyperlipidemia  Comprehensive Metabolic Panel    LDL Cholesterol, Direct      3. Heart disease  CBC with Auto Differential      4. Abdominal aortic aneurysm (AAA) without rupture, unspecified part (HCC)          His hypertension is controlled, continue current medication management    Hyperlipidemia wise, his last LDL was controlled, check above lab work    For his heart disease, he is asymptomatic, continue following with cardiology    For his AAA, he will continue following with cardiology    He is to consider Shingrix and RSV vaccines    Follow-up in, 6 months or sooner if needed        O: /60 (Site: Right Upper Arm, Position: Sitting, Cuff Size: Medium Adult)   Ht 1.727 m (5' 8\")   Wt 71 kg (156 lb 9.6 oz)   BMI 23.81 kg/m²   Gen- NAD, pleasant  Neck-supple, no lymphadenopathy appreciated  HEENT- Eyes without icterus or injection  Lungs- CTAB  Heart- RRR  Abdomen- Soft, non tender  Ext- No edema  Psych- Appropriate, euthymic, no si/hi    S: CC-check up    HPI-patient with wife today presents for follow-up of hypertension, hyperlipidemia, CAD.  He is seeing Dr. Hernandez in cardiology for his CAD with history of intervention, and has been evaluated by him for his AAA.  He follows up yearly.with cardiology  He denies any chest pain, dyspnea, palpitations.    ROS- Per HPI    Patient's medications, allergies, and past medical hx were reviewed     JEREMY MARIO DO

## 2024-03-26 NOTE — PROGRESS NOTES
TRANSESOPHAGEAL ECHOCARDIOGRAM  2018    during emergent CABG      Family History   Problem Relation Age of Onset    Breast Cancer Sister     Other Brother         AAA      Social History     Tobacco Use    Smoking status: Former     Current packs/day: 0.00     Average packs/day: 1 pack/day for 50.0 years (50.0 ttl pk-yrs)     Types: Cigarettes     Start date:      Quit date: 2008     Years since quittin.2    Smokeless tobacco: Never    Tobacco comments:     counseled on tobacco exposure avoidance   Vaping Use    Vaping Use: Never used   Substance Use Topics    Alcohol use: Yes     Alcohol/week: 1.0 standard drink of alcohol     Types: 1 Standard drinks or equivalent per week     Comment: once per week    Drug use: No     No Known Allergies      Review of Systems -   Constitutional: Negative for weight gain/loss; malaise, fever  Respiratory: Negative for Asthma;  cough and hemoptysis  Cardiovascular: Negative for palpitations,dizziness   Gastrointestinal: Negative for abd.pain; constipation/diarrhea;    Genitourinary: Negative for stones; hematuria; frequency hesitancy  Integumentt: Negative for rash or pruritis  Hematologic/lymphatic: Negative for blood dyscrasia; leukemia/lymphoma  Musculoskeletal: Negative for Connective tissue disease  Neurological:  Negative for Seizure   Behavioral/Psych:Negative for Bipolar disorder, Schizophrenia; Dementia  Endocrine: negative for thyroid, parathyroid disease    Physical Examination:    There were no vitals taken for this visit.   HEENT:  Face: Atraumatic, Conjunctiva: Pink; non icteric,  Mucous Memb:  Moist, No thyromegaly or Lymphadenopathy  Respiratory:  Resp Assessment: WNL, Resp Auscultation: Clear  Cardiovascular:  Auscultation: nl S1 & S2, Palpation:  Nl PMI; No heaves or thrills, JVP:  normal  Abdomen: Soft, non-tender, Normal bowel sounds,  No organomegaly  Extremities: No Cyanosis or Clubbing  Neurological: Oriented to time, place, and person,

## 2024-03-27 ENCOUNTER — OFFICE VISIT (OUTPATIENT)
Dept: CARDIOLOGY CLINIC | Age: 89
End: 2024-03-27
Payer: MEDICARE

## 2024-03-27 VITALS
HEIGHT: 68 IN | BODY MASS INDEX: 23.19 KG/M2 | DIASTOLIC BLOOD PRESSURE: 60 MMHG | HEART RATE: 56 BPM | OXYGEN SATURATION: 96 % | SYSTOLIC BLOOD PRESSURE: 110 MMHG | WEIGHT: 153 LBS

## 2024-03-27 DIAGNOSIS — Z95.1 S/P CABG X 2: Primary | ICD-10-CM

## 2024-03-27 PROCEDURE — 1036F TOBACCO NON-USER: CPT | Performed by: INTERNAL MEDICINE

## 2024-03-27 PROCEDURE — 93000 ELECTROCARDIOGRAM COMPLETE: CPT | Performed by: INTERNAL MEDICINE

## 2024-03-27 PROCEDURE — G8427 DOCREV CUR MEDS BY ELIG CLIN: HCPCS | Performed by: INTERNAL MEDICINE

## 2024-03-27 PROCEDURE — G8420 CALC BMI NORM PARAMETERS: HCPCS | Performed by: INTERNAL MEDICINE

## 2024-03-27 PROCEDURE — G8484 FLU IMMUNIZE NO ADMIN: HCPCS | Performed by: INTERNAL MEDICINE

## 2024-03-27 PROCEDURE — 99214 OFFICE O/P EST MOD 30 MIN: CPT | Performed by: INTERNAL MEDICINE

## 2024-03-27 PROCEDURE — 1123F ACP DISCUSS/DSCN MKR DOCD: CPT | Performed by: INTERNAL MEDICINE

## 2024-04-13 DIAGNOSIS — Z95.1 S/P CABG X 2: ICD-10-CM

## 2024-04-15 RX ORDER — METOPROLOL SUCCINATE 25 MG/1
TABLET, EXTENDED RELEASE ORAL
Qty: 45 TABLET | Refills: 3 | Status: SHIPPED | OUTPATIENT
Start: 2024-04-15

## 2024-04-15 RX ORDER — LISINOPRIL 10 MG/1
TABLET ORAL
Qty: 90 TABLET | Refills: 3 | Status: SHIPPED | OUTPATIENT
Start: 2024-04-15

## 2024-04-15 NOTE — TELEPHONE ENCOUNTER
Medication:   Requested Prescriptions     Pending Prescriptions Disp Refills    lisinopril (PRINIVIL;ZESTRIL) 10 MG tablet [Pharmacy Med Name: LISINOPRIL 10 MG Tablet] 90 tablet 3     Sig: TAKE 1 TABLET EVERY DAY       Last Filled:  1/8/2024    Patient Phone Number: [phone] (home)     Last appt: 2/29/2024   Next appt: 8/30/2024

## 2024-05-30 ENCOUNTER — TELEPHONE (OUTPATIENT)
Dept: FAMILY MEDICINE CLINIC | Age: 89
End: 2024-05-30

## 2024-05-30 NOTE — TELEPHONE ENCOUNTER
[facility] called stating that pt wanted to come in to get his ears cleaned. There is no irritation, but they just wanted to know if there was any way they could get pt ear cleaned before they leave for [place] on Monday. [facility] is reachable at [phone]

## 2024-05-31 ENCOUNTER — OFFICE VISIT (OUTPATIENT)
Dept: FAMILY MEDICINE CLINIC | Age: 89
End: 2024-05-31

## 2024-05-31 VITALS
WEIGHT: 157 LBS | DIASTOLIC BLOOD PRESSURE: 80 MMHG | HEIGHT: 67 IN | OXYGEN SATURATION: 96 % | SYSTOLIC BLOOD PRESSURE: 100 MMHG | HEART RATE: 60 BPM | BODY MASS INDEX: 24.64 KG/M2

## 2024-05-31 DIAGNOSIS — H61.23 BILATERAL IMPACTED CERUMEN: Primary | ICD-10-CM

## 2024-05-31 DIAGNOSIS — H92.02 LEFT EAR PAIN: ICD-10-CM

## 2024-05-31 RX ORDER — AZITHROMYCIN 250 MG/1
TABLET, FILM COATED ORAL
Qty: 6 TABLET | Refills: 0 | Status: SHIPPED | OUTPATIENT
Start: 2024-05-31 | End: 2024-06-10

## 2024-05-31 SDOH — ECONOMIC STABILITY: FOOD INSECURITY: WITHIN THE PAST 12 MONTHS, YOU WORRIED THAT YOUR FOOD WOULD RUN OUT BEFORE YOU GOT MONEY TO BUY MORE.: NEVER TRUE

## 2024-05-31 SDOH — ECONOMIC STABILITY: FOOD INSECURITY: WITHIN THE PAST 12 MONTHS, THE FOOD YOU BOUGHT JUST DIDN'T LAST AND YOU DIDN'T HAVE MONEY TO GET MORE.: NEVER TRUE

## 2024-05-31 SDOH — ECONOMIC STABILITY: INCOME INSECURITY: HOW HARD IS IT FOR YOU TO PAY FOR THE VERY BASICS LIKE FOOD, HOUSING, MEDICAL CARE, AND HEATING?: NOT HARD AT ALL

## 2024-05-31 ASSESSMENT — PATIENT HEALTH QUESTIONNAIRE - PHQ9
2. FEELING DOWN, DEPRESSED OR HOPELESS: NOT AT ALL
SUM OF ALL RESPONSES TO PHQ QUESTIONS 1-9: 0
SUM OF ALL RESPONSES TO PHQ QUESTIONS 1-9: 0
SUM OF ALL RESPONSES TO PHQ9 QUESTIONS 1 & 2: 0
SUM OF ALL RESPONSES TO PHQ QUESTIONS 1-9: 0
1. LITTLE INTEREST OR PLEASURE IN DOING THINGS: NOT AT ALL
SUM OF ALL RESPONSES TO PHQ QUESTIONS 1-9: 0

## 2024-05-31 NOTE — TELEPHONE ENCOUNTER
I do not have slots available, ok to schedule acute visit with NP if there are openings, occasionally we are unable to get the wax out

## 2024-05-31 NOTE — PROGRESS NOTES
Richard Canales (:  1935) is a 88 y.o. male,Established patient, here for evaluation of the following chief complaint(s):  Cerumen Impaction (Bilateral ears)      Assessment & Plan   1. Bilateral impacted cerumen  - Ceruminosis is noted.  Wax is removed by syringing and flushing with debrox and water. Instructions for home care to prevent wax buildup are given. Right TM free from infection or trauma. Left TM erythematous  - slight infection noticed    2. Left ear pain  -     azithromycin (ZITHROMAX) 250 MG tablet; 500mg on day 1 followed by 250mg on days 2 - 5, Disp-6 tablet, R-0Normal  - avoid putting any type of foreign objects in the ears  - monitor for any drainage      No follow-ups on file.       Subjective   HPI    Patient presents today for decreased hearing in both ears. Patient states he gets frequent wax buildup in both ears. Denies any ringing in ears. Denies trying to flush ears out at home. States slight pain in both ears. States sx for about 2 days.    Review of Systems   See HPI    Objective   Physical Exam  Vitals and nursing note reviewed.   Constitutional:       Appearance: Normal appearance.   HENT:      Right Ear: There is impacted cerumen.      Left Ear: Tenderness present. There is impacted cerumen. Tympanic membrane is erythematous.      Nose: Nose normal.      Mouth/Throat:      Mouth: Mucous membranes are moist.      Pharynx: Oropharynx is clear.   Musculoskeletal:         General: Normal range of motion.   Skin:     General: Skin is warm and dry.   Neurological:      General: No focal deficit present.      Mental Status: He is alert and oriented to person, place, and time.          On this date 2024 I have spent 25 minutes reviewing previous notes, test results and face to face with the patient discussing the diagnosis and importance of compliance with the treatment plan as well as documenting on the day of the visit.      An electronic signature was used to authenticate

## 2024-08-30 ENCOUNTER — OFFICE VISIT (OUTPATIENT)
Dept: FAMILY MEDICINE CLINIC | Age: 89
End: 2024-08-30

## 2024-08-30 VITALS
SYSTOLIC BLOOD PRESSURE: 120 MMHG | OXYGEN SATURATION: 98 % | HEART RATE: 57 BPM | BODY MASS INDEX: 22.85 KG/M2 | DIASTOLIC BLOOD PRESSURE: 74 MMHG | WEIGHT: 150.8 LBS | RESPIRATION RATE: 16 BRPM | HEIGHT: 68 IN

## 2024-08-30 DIAGNOSIS — I10 PRIMARY HYPERTENSION: Primary | ICD-10-CM

## 2024-08-30 DIAGNOSIS — I51.9 HEART DISEASE: ICD-10-CM

## 2024-08-30 DIAGNOSIS — E78.49 OTHER HYPERLIPIDEMIA: ICD-10-CM

## 2024-08-30 LAB
ALBUMIN SERPL-MCNC: 4 G/DL (ref 3.4–5)
ALBUMIN/GLOB SERPL: 2.2 {RATIO} (ref 1.1–2.2)
ALP SERPL-CCNC: 66 U/L (ref 40–129)
ALT SERPL-CCNC: 16 U/L (ref 10–40)
ANION GAP SERPL CALCULATED.3IONS-SCNC: 8 MMOL/L (ref 3–16)
AST SERPL-CCNC: 21 U/L (ref 15–37)
BASOPHILS # BLD: 0 K/UL (ref 0–0.2)
BASOPHILS NFR BLD: 0.3 %
BILIRUB SERPL-MCNC: 0.4 MG/DL (ref 0–1)
BUN SERPL-MCNC: 15 MG/DL (ref 7–20)
CALCIUM SERPL-MCNC: 8.9 MG/DL (ref 8.3–10.6)
CHLORIDE SERPL-SCNC: 109 MMOL/L (ref 99–110)
CO2 SERPL-SCNC: 26 MMOL/L (ref 21–32)
CREAT SERPL-MCNC: 1 MG/DL (ref 0.8–1.3)
DEPRECATED RDW RBC AUTO: 14.4 % (ref 12.4–15.4)
EOSINOPHIL # BLD: 0 K/UL (ref 0–0.6)
EOSINOPHIL NFR BLD: 0.9 %
GFR SERPLBLD CREATININE-BSD FMLA CKD-EPI: 73 ML/MIN/{1.73_M2}
GLUCOSE SERPL-MCNC: 100 MG/DL (ref 70–99)
HCT VFR BLD AUTO: 40.7 % (ref 40.5–52.5)
HGB BLD-MCNC: 13.8 G/DL (ref 13.5–17.5)
LDLC SERPL-MCNC: 42 MG/DL
LYMPHOCYTES # BLD: 1 K/UL (ref 1–5.1)
LYMPHOCYTES NFR BLD: 25.7 %
MCH RBC QN AUTO: 32 PG (ref 26–34)
MCHC RBC AUTO-ENTMCNC: 33.9 G/DL (ref 31–36)
MCV RBC AUTO: 94.5 FL (ref 80–100)
MONOCYTES # BLD: 0.3 K/UL (ref 0–1.3)
MONOCYTES NFR BLD: 8.9 %
NEUTROPHILS # BLD: 2.5 K/UL (ref 1.7–7.7)
NEUTROPHILS NFR BLD: 64.2 %
PLATELET # BLD AUTO: 160 K/UL (ref 135–450)
PMV BLD AUTO: 9.2 FL (ref 5–10.5)
POTASSIUM SERPL-SCNC: 4.8 MMOL/L (ref 3.5–5.1)
PROT SERPL-MCNC: 5.8 G/DL (ref 6.4–8.2)
RBC # BLD AUTO: 4.3 M/UL (ref 4.2–5.9)
SODIUM SERPL-SCNC: 143 MMOL/L (ref 136–145)
WBC # BLD AUTO: 3.9 K/UL (ref 4–11)

## 2024-08-30 SDOH — ECONOMIC STABILITY: FOOD INSECURITY: WITHIN THE PAST 12 MONTHS, YOU WORRIED THAT YOUR FOOD WOULD RUN OUT BEFORE YOU GOT MONEY TO BUY MORE.: NEVER TRUE

## 2024-08-30 SDOH — ECONOMIC STABILITY: INCOME INSECURITY: HOW HARD IS IT FOR YOU TO PAY FOR THE VERY BASICS LIKE FOOD, HOUSING, MEDICAL CARE, AND HEATING?: NOT HARD AT ALL

## 2024-08-30 SDOH — ECONOMIC STABILITY: FOOD INSECURITY: WITHIN THE PAST 12 MONTHS, THE FOOD YOU BOUGHT JUST DIDN'T LAST AND YOU DIDN'T HAVE MONEY TO GET MORE.: NEVER TRUE

## 2024-08-30 ASSESSMENT — PATIENT HEALTH QUESTIONNAIRE - PHQ9
1. LITTLE INTEREST OR PLEASURE IN DOING THINGS: NOT AT ALL
2. FEELING DOWN, DEPRESSED OR HOPELESS: NOT AT ALL
SUM OF ALL RESPONSES TO PHQ QUESTIONS 1-9: 0
SUM OF ALL RESPONSES TO PHQ9 QUESTIONS 1 & 2: 0
SUM OF ALL RESPONSES TO PHQ QUESTIONS 1-9: 0

## 2024-08-30 NOTE — PROGRESS NOTES
.       Diagnosis Orders   1. Primary hypertension, controlled Comprehensive Metabolic Panel    CBC with Auto Differential   Continue current medication management   2. Other hyperlipidemia, controlled on last check LDL Cholesterol, Direct      3. Heart disease, stable and asymptomatic Continue with current management        He is to let me know if he is agreeable to dermatology referral    Follow-up in 6 months, or sooner if needed        O: /74   Pulse 57   Resp 16   Ht 1.727 m (5' 8\")   Wt 68.4 kg (150 lb 12.8 oz)   SpO2 98%   BMI 22.93 kg/m²   Gen- NAD, pleasant  Skin-lots of sun damage appreciated  Neck-supple, no lymphadenopathy appreciated  HEENT- Eyes without icterus or injection  Lungs- CTAB  Heart- RRR  Abdomen- Soft, non tender  Ext- No edema  Psych- Appropriate, euthymic, no si/hi    S: CC-chronic disease management    HPI-patient presents for follow-up of hypertension, hyperlipidemia, CAD.  He reports he is doing well.  He has no concerns.  Denies chest pain, shortness of breath, palpitations.  He declines referral to dermatology.  He reports his wife's dermatologist was not able to add him to her panel.    ROS- Per HPI    Patient's medications, allergies, and past medical hx were reviewed     Mellisa Gee DO

## 2024-09-12 NOTE — TELEPHONE ENCOUNTER
Last OV: 3/27/24  Next OV: X due yearly  Last refill: 3/16/23 #45  5 R/F  Most recent Labs: 2/29/24  Last EKG (if needed): 3/27/24       no

## 2024-10-15 DIAGNOSIS — Z95.1 S/P CABG X 2: ICD-10-CM

## 2024-10-15 RX ORDER — ATORVASTATIN CALCIUM 20 MG/1
TABLET, FILM COATED ORAL
Qty: 90 TABLET | Refills: 3 | Status: SHIPPED | OUTPATIENT
Start: 2024-10-15

## 2024-10-15 NOTE — TELEPHONE ENCOUNTER
Last OV: 3/27/24  Next OV: X due yearly  Last refill: 9/21/23  #90 3 r/f  Most recent Labs: 8/30/24  Last EKG (if needed): 3/27/24

## 2025-02-17 DIAGNOSIS — Z95.1 S/P CABG X 2: ICD-10-CM

## 2025-02-17 NOTE — TELEPHONE ENCOUNTER
Last OV: 3/27/24  Next OV: 3/24/25  Last refill: 1/15/24, 10/15/24  Most recent Labs: 8/30/24  Last EKG (if needed): 3/27/24

## 2025-02-17 NOTE — TELEPHONE ENCOUNTER
Medication Refill    When was your last appointment with cardiology?    (If 1 yr or longer, please schedule appointment)    (If patient has been told they do not need to follow-up - medications should be filled by PCP)  3/27/24    When did you last have labs drawn?   8/30/24    Medication needing refilled?  atorvastatin (LIPITOR)   lisinopril (PRINIVIL;ZESTRIL)   metoprolol succinate (TOPROL XL)     Dosage of the medication?  20 MG tablet   10 MG tablet   25 MG extended release tablet     How are you taking this medication (QD, BID, TID, QID, PRN)?  TAKE 1 TABLET EVERY NIGHT   TAKE 1 TABLET EVERY DAY   TAKE 1/2 TABLET EVERY DAY     Do you want a 30 or 90 day supply?  90    When will you run out of your medication?   2/26/25    Which Pharmacy are we sending this medication to?  [facility]    Pharmacy Phone:[phone]   Pharmacy Fax:[phone]

## 2025-02-18 RX ORDER — ATORVASTATIN CALCIUM 20 MG/1
TABLET, FILM COATED ORAL
Qty: 90 TABLET | Refills: 3 | Status: SHIPPED | OUTPATIENT
Start: 2025-02-18

## 2025-02-18 RX ORDER — LISINOPRIL 10 MG/1
10 TABLET ORAL DAILY
Qty: 90 TABLET | Refills: 3 | Status: SHIPPED | OUTPATIENT
Start: 2025-02-18

## 2025-02-18 RX ORDER — METOPROLOL SUCCINATE 25 MG/1
TABLET, EXTENDED RELEASE ORAL
Qty: 45 TABLET | Refills: 3 | Status: SHIPPED | OUTPATIENT
Start: 2025-02-18

## 2025-03-05 ENCOUNTER — OFFICE VISIT (OUTPATIENT)
Dept: FAMILY MEDICINE CLINIC | Age: 89
End: 2025-03-05

## 2025-03-05 VITALS
HEIGHT: 67 IN | HEART RATE: 64 BPM | DIASTOLIC BLOOD PRESSURE: 70 MMHG | SYSTOLIC BLOOD PRESSURE: 111 MMHG | BODY MASS INDEX: 23.26 KG/M2 | WEIGHT: 148.2 LBS | OXYGEN SATURATION: 99 % | RESPIRATION RATE: 16 BRPM

## 2025-03-05 DIAGNOSIS — I10 PRIMARY HYPERTENSION: Primary | ICD-10-CM

## 2025-03-05 DIAGNOSIS — E78.00 HYPERCHOLESTEROLEMIA: ICD-10-CM

## 2025-03-05 DIAGNOSIS — I71.40 ABDOMINAL AORTIC ANEURYSM (AAA) WITHOUT RUPTURE, UNSPECIFIED PART: ICD-10-CM

## 2025-03-05 DIAGNOSIS — I25.10 CORONARY ARTERY DISEASE INVOLVING NATIVE CORONARY ARTERY OF NATIVE HEART WITHOUT ANGINA PECTORIS: ICD-10-CM

## 2025-03-05 SDOH — ECONOMIC STABILITY: FOOD INSECURITY: WITHIN THE PAST 12 MONTHS, THE FOOD YOU BOUGHT JUST DIDN'T LAST AND YOU DIDN'T HAVE MONEY TO GET MORE.: NEVER TRUE

## 2025-03-05 SDOH — ECONOMIC STABILITY: FOOD INSECURITY: WITHIN THE PAST 12 MONTHS, YOU WORRIED THAT YOUR FOOD WOULD RUN OUT BEFORE YOU GOT MONEY TO BUY MORE.: NEVER TRUE

## 2025-03-05 ASSESSMENT — PATIENT HEALTH QUESTIONNAIRE - PHQ9
2. FEELING DOWN, DEPRESSED OR HOPELESS: NOT AT ALL
SUM OF ALL RESPONSES TO PHQ QUESTIONS 1-9: 0
1. LITTLE INTEREST OR PLEASURE IN DOING THINGS: NOT AT ALL
SUM OF ALL RESPONSES TO PHQ QUESTIONS 1-9: 0

## 2025-03-05 NOTE — ASSESSMENT & PLAN NOTE
Last evaluated in 2020:  Unilateral ImpressionBilobed fusiform abdominal aortic aneurysm with maximum  diameter of 3.51 x 3.42 cm.  Cardiology has ordered follow up study, but not completed by patient  Because the order is about to , I will place new order for this

## 2025-03-05 NOTE — ASSESSMENT & PLAN NOTE
Controlled continue lisinopril 10 mg, metoprolol XL 25 mg  Plan for repeat labs in 6 months at next follow-up

## 2025-03-05 NOTE — ASSESSMENT & PLAN NOTE
emergency CABGx2 on 5/9/18 LIMA-LAD, reserve SVG-OM1, IABP   On aspirin 81 mg, atorvastatin at 20 mg, but patient has fairly low LDL.

## 2025-03-05 NOTE — PROGRESS NOTES
template.  I have seen and examined the patient.  Everything documented was personally performed at this visit with the necessary additions, deletions and changes made as appropriate. Voice recognition software may have been used to facilitate documentation and note may have errors due to use of this software. Please reach out if any questions about contents of this note.        Aki Nichols MD  Med-Peds: Primary Care  [address] [address]  Office Phone: [phone]  Office Fax: [phone]

## 2025-03-05 NOTE — ASSESSMENT & PLAN NOTE
Lab Results   Component Value Date    LDL 58 10/28/2021    LDLDIRECT 42 08/30/2024     Controlled on atorvastatin 20 mg.

## 2025-03-05 NOTE — PATIENT INSTRUCTIONS
To schedule your ultrasound please call our scheduling number at [phone]          I recommend all patients get updated Influenza and COVID-19 vaccinations each year.   -People ages 65 years and older, are recommended to receive 2 doses of any [id number] COVID-19 vaccine  by 6 months (minimum interval 2 months) regardless of vaccination history (Generally recommend thinking of this as getting a COVID shot in the fall and the spring).    For anyone over the age of 50, I recommend the Shingles vaccine:   This is a 2 dose series  by 2-6 months.   -Please check with your insurance provider before getting this vaccination to make sure they will cover the cost because it can be expensive.     For anyone over the age of 60, I recommend the RSV vaccine.    This is a one time vaccination   RSV can cause pneumonia and inflammation of the small airways in the lung leading to respiratory failure; especially dangerous for infants, young children, and older adults

## 2025-03-06 PROBLEM — I20.0 UNSTABLE ANGINA (HCC): Status: RESOLVED | Noted: 2018-05-09 | Resolved: 2025-03-06

## 2025-03-14 ENCOUNTER — TELEMEDICINE (OUTPATIENT)
Dept: FAMILY MEDICINE CLINIC | Age: 89
End: 2025-03-14

## 2025-03-14 DIAGNOSIS — Z00.00 MEDICARE ANNUAL WELLNESS VISIT, SUBSEQUENT: Primary | ICD-10-CM

## 2025-03-14 ASSESSMENT — PATIENT HEALTH QUESTIONNAIRE - PHQ9
1. LITTLE INTEREST OR PLEASURE IN DOING THINGS: NOT AT ALL
2. FEELING DOWN, DEPRESSED OR HOPELESS: NOT AT ALL
SUM OF ALL RESPONSES TO PHQ QUESTIONS 1-9: 0

## 2025-03-14 NOTE — PROGRESS NOTES
Medicare Annual Wellness Visit    Richard Canales is here for Medicare AWV    Assessment & Plan   Medicare annual wellness visit, subsequent     Return if symptoms worsen or fail to improve.     Subjective       Patient's complete Health Risk Assessment and screening values have been reviewed and are found in Flowsheets. The following problems were reviewed today and where indicated follow up appointments were made and/or referrals ordered.    Positive Risk Factor Screenings with Interventions:                 Dentist Screen:  Have you seen the dentist within the past year?: (!) No    Intervention:  Reports he only has a few teeth and because of this he is not always followed up with his dentist, but he does have 1 and he will make a follow-up appointment.              Cognitive screening: Normal mini-cog screening          Objective    Patient-Reported Vitals  No data recorded             No Known Allergies  Prior to Visit Medications    Medication Sig Taking? Authorizing Provider   atorvastatin (LIPITOR) 20 MG tablet TAKE 1 TABLET EVERY NIGHT Yes John Hernandez MD   lisinopril (PRINIVIL;ZESTRIL) 10 MG tablet Take 1 tablet by mouth daily Yes John Hernandez MD   metoprolol succinate (TOPROL XL) 25 MG extended release tablet TAKE 1/2 TABLET EVERY DAY Yes John Hernandez MD   aspirin 81 MG EC tablet Take 1 tablet by mouth daily Yes John Hernandez MD       CareTeam (Including outside providers/suppliers regularly involved in providing care):   Patient Care Team:  Aki Nichols MD as PCP - General (Pediatrics)  Aki Nichols MD as PCP - Empaneled Provider  John Hernandez MD as Consulting Physician (Cardiology)     Recommendations for Preventive Services Due: see orders and patient instructions/AVS.  Recommended screening schedule for the next 5-10 years is provided to the patient in written form: see Patient Instructions/AVS.     Reviewed and updated this visit:  Tobacco  Allergies  Meds  Problems  Med Hx  Surg Hx   Patient called back and agreed to taking the appointment slot on 1/26 at 9:30 AM for 60 minutes.

## 2025-03-14 NOTE — PATIENT INSTRUCTIONS
instruction, always ask your healthcare professional. [employer], [facility], disclaims any warranty or liability for your use of this information.    Personalized Preventive Plan for Richard Canales - 3/14/2025  Medicare offers a range of preventive health benefits. Some of the tests and screenings are paid in full while other may be subject to a deductible, co-insurance, and/or copay.  Some of these benefits include a comprehensive review of your medical history including lifestyle, illnesses that may run in your family, and various assessments and screenings as appropriate.  After reviewing your medical record and screening and assessments performed today your provider may have ordered immunizations, labs, imaging, and/or referrals for you.  A list of these orders (if applicable) as well as your Preventive Care list are included within your After Visit Summary for your review.

## 2025-03-24 ENCOUNTER — OFFICE VISIT (OUTPATIENT)
Dept: CARDIOLOGY CLINIC | Age: 89
End: 2025-03-24
Payer: MEDICARE

## 2025-03-24 VITALS
WEIGHT: 149.4 LBS | BODY MASS INDEX: 23.4 KG/M2 | DIASTOLIC BLOOD PRESSURE: 60 MMHG | HEART RATE: 66 BPM | OXYGEN SATURATION: 93 % | SYSTOLIC BLOOD PRESSURE: 120 MMHG

## 2025-03-24 DIAGNOSIS — I10 HYPERTENSION, UNSPECIFIED TYPE: Primary | ICD-10-CM

## 2025-03-24 DIAGNOSIS — Z95.1 STATUS POST CORONARY ARTERY BYPASS GRAFT: ICD-10-CM

## 2025-03-24 PROCEDURE — 1123F ACP DISCUSS/DSCN MKR DOCD: CPT | Performed by: INTERNAL MEDICINE

## 2025-03-24 PROCEDURE — 93000 ELECTROCARDIOGRAM COMPLETE: CPT | Performed by: INTERNAL MEDICINE

## 2025-03-24 PROCEDURE — G2211 COMPLEX E/M VISIT ADD ON: HCPCS | Performed by: INTERNAL MEDICINE

## 2025-03-24 PROCEDURE — 1159F MED LIST DOCD IN RCRD: CPT | Performed by: INTERNAL MEDICINE

## 2025-03-24 PROCEDURE — 99214 OFFICE O/P EST MOD 30 MIN: CPT | Performed by: INTERNAL MEDICINE

## 2025-03-24 NOTE — PROGRESS NOTES
[facility]  Cardiology Note      Richard Canales  1935, 89 y.o.      CC: Annual follow up CAD   Aki Nichols MD:    Problem list:   CAD s/p CABG  Hypertension  Hyperlipidemia       HPI: Richard Canales is a 89 y.o. patient with a past medical history significant for CAD s/p CABG, abdominal aortic aneurysm, hypertension, and hyperlipidemia. He completed a stress test to the hospital because of epigastric burning.  The stress test was markedly abnormal.  He then underwent coronary angiography which showed critical ostial left main disease.  He underwent emergency CABGx2 on 5/9/18 LIMA-LAD, reserve SVG-OM1, IABP. Extubated postop day one. Had transient heart block on telemetry monitoring with pacing wires working on demand.     Today, he presents for follow up.  No complaints.  Denies chest pain shortness of breath palpitations or dizziness.  Continues to work.            Past Medical History:   Diagnosis Date    AAA (abdominal aortic aneurysm) 05/09/2018    3.8 cm    Coronary artery disease 05/09/2018    s/p CABG    COVID-19     Macular degeneration     Thrombophlebitis 2016    left lower extremity      Past Surgical History:   Procedure Laterality Date    CARDIAC CATHETERIZATION  05/09/2018    Dr. Luu - w/placement of IABP    COLONOSCOPY  11/2018    Dr. Lott    COLONOSCOPY N/A 11/12/2018    COLONOSCOPY POLYPECTOMY SNARE/COLD BIOPSY performed by Andrade Lott MD at [facility] ENDOSCOPY    COLONOSCOPY N/A 11/12/2018    COLONOSCOPY CONTROL HEMORRHAGE performed by Andrade Lott MD at [facility] ENDOSCOPY    CORONARY ARTERY BYPASS GRAFT  05/09/2018    Dr. Do - emergent x2 (LIMA-LAD, reverse R SVG-OM1), removal of IABP, placement of temporary pacing wire    PRE-MALIGNANT / BENIGN SKIN LESION EXCISION  11/11/2008    back     TRANSESOPHAGEAL ECHOCARDIOGRAM  05/09/2018    during emergent CABG      Family History   Problem Relation Age of Onset    Breast Cancer Sister     Other Brother

## 2025-07-21 ENCOUNTER — TELEPHONE (OUTPATIENT)
Dept: PHARMACY | Facility: CLINIC | Age: 89
End: 2025-07-21

## 2025-07-21 NOTE — TELEPHONE ENCOUNTER
[facility] CLINICAL PHARMACY: ADHERENCE REVIEW  Identified care gap per [employer]: fills at [facility]: ACE/ARB adherence    Patient also appears to be prescribed: Statin    ASSESSMENT    ACE/ARB ADHERENCE    Insurance Records claims through 2025 (Prior Year PDC = not reported; YTD PDC = FIRST FILL; Potential Fail Date: 2025):   LISINOPRIL TAB 10MG  last filled on 2025 for 100 day supply. Next refill due: 2025    Prescribed si tablet/capsule daily    Per Insurer Portal: last filled on 2025 for 100 day supply.     Per [facility]: last Shipped out on on 2025 for 100 day supply. will get  100 day supply ready to ship out since past due.  1 refill remaining after shipment    BP Readings from Last 3 Encounters:   25 120/60   25 111/70   24 120/74     CrCl cannot be calculated (Patient's most recent lab result is older than the maximum 180 days allowed.).  Lab Results   Component Value Date    CREATININE 1.0 2024     Lab Results   Component Value Date    K 4.8 2024     STATIN ADHERENCE    Insurance Records claims through 2025 (Prior Year PDC = not reported; YTD PDC = FIRST FILL; Potential Fail Date: 2025):   ATORVASTATIN TAB 20MG  last filled on 2025 for 100 day supply. Next refill due: 2025    Prescribed si tablet/capsule daily    Per Insurer Portal: last filled on 2025 for 100 day supply.     Per [facility]: last picked up on 2025 for 100 day supply. will get  100 day supply ready to  since past due.  1 refill remaining after shipment    Lab Results   Component Value Date    CHOL 129 10/28/2021    TRIG 48 10/28/2021    HDL 61 (H) 10/28/2021    LDLDIRECT 42 2024     Lab Results   Component Value Date    LDL 58 10/28/2021    LDLDIRECT 42 2024      ALT   Date Value Ref Range Status   2024 16 10 - 40 U/L Final     AST   Date Value Ref Range Status   2024 21 15 - 37 U/L

## 2025-09-05 ENCOUNTER — OFFICE VISIT (OUTPATIENT)
Dept: FAMILY MEDICINE CLINIC | Age: 89
End: 2025-09-05

## 2025-09-05 ENCOUNTER — LAB (OUTPATIENT)
Dept: FAMILY MEDICINE CLINIC | Age: 89
End: 2025-09-05
Payer: MEDICARE

## 2025-09-05 VITALS
SYSTOLIC BLOOD PRESSURE: 110 MMHG | HEART RATE: 51 BPM | DIASTOLIC BLOOD PRESSURE: 64 MMHG | HEIGHT: 67 IN | OXYGEN SATURATION: 98 % | BODY MASS INDEX: 23.26 KG/M2 | WEIGHT: 148.2 LBS

## 2025-09-05 DIAGNOSIS — Z13.1 ENCOUNTER FOR SCREENING FOR DIABETES MELLITUS: ICD-10-CM

## 2025-09-05 DIAGNOSIS — Z95.1 S/P CABG X 2: ICD-10-CM

## 2025-09-05 DIAGNOSIS — E78.00 HYPERCHOLESTEROLEMIA: ICD-10-CM

## 2025-09-05 DIAGNOSIS — I71.40 ABDOMINAL AORTIC ANEURYSM (AAA) WITHOUT RUPTURE, UNSPECIFIED PART: ICD-10-CM

## 2025-09-05 DIAGNOSIS — I10 PRIMARY HYPERTENSION: Primary | ICD-10-CM

## 2025-09-05 DIAGNOSIS — I10 PRIMARY HYPERTENSION: ICD-10-CM

## 2025-09-05 DIAGNOSIS — L57.0 ACTINIC KERATOSIS OF RIGHT CHEEK: ICD-10-CM

## 2025-09-05 LAB
ALBUMIN SERPL-MCNC: 4 G/DL (ref 3.4–5)
ALBUMIN/GLOB SERPL: 2 {RATIO} (ref 1.1–2.2)
ALP SERPL-CCNC: 64 U/L (ref 40–129)
ALT SERPL-CCNC: 19 U/L (ref 10–40)
ANION GAP SERPL CALCULATED.3IONS-SCNC: 9 MMOL/L (ref 3–16)
AST SERPL-CCNC: 22 U/L (ref 15–37)
BASOPHILS # BLD: 0 K/UL (ref 0–0.2)
BASOPHILS NFR BLD: 0.5 %
BILIRUB SERPL-MCNC: 0.5 MG/DL (ref 0–1)
BUN SERPL-MCNC: 18 MG/DL (ref 7–20)
CALCIUM SERPL-MCNC: 9 MG/DL (ref 8.3–10.6)
CHLORIDE SERPL-SCNC: 105 MMOL/L (ref 99–110)
CHOLEST SERPL-MCNC: 119 MG/DL (ref 0–199)
CO2 SERPL-SCNC: 26 MMOL/L (ref 21–32)
CREAT SERPL-MCNC: 1.2 MG/DL (ref 0.8–1.3)
DEPRECATED RDW RBC AUTO: 13.9 % (ref 12.4–15.4)
EOSINOPHIL # BLD: 0 K/UL (ref 0–0.6)
EOSINOPHIL NFR BLD: 1.2 %
GFR SERPLBLD CREATININE-BSD FMLA CKD-EPI: 58 ML/MIN/{1.73_M2}
GLUCOSE SERPL-MCNC: 93 MG/DL (ref 70–99)
HCT VFR BLD AUTO: 40.7 % (ref 40.5–52.5)
HDLC SERPL-MCNC: 61 MG/DL (ref 40–60)
HGB BLD-MCNC: 13.8 G/DL (ref 13.5–17.5)
LDLC SERPL CALC-MCNC: 43 MG/DL
LYMPHOCYTES # BLD: 1 K/UL (ref 1–5.1)
LYMPHOCYTES NFR BLD: 23.4 %
MCH RBC QN AUTO: 31.5 PG (ref 26–34)
MCHC RBC AUTO-ENTMCNC: 33.8 G/DL (ref 31–36)
MCV RBC AUTO: 93.1 FL (ref 80–100)
MONOCYTES # BLD: 0.3 K/UL (ref 0–1.3)
MONOCYTES NFR BLD: 8.3 %
NEUTROPHILS # BLD: 2.8 K/UL (ref 1.7–7.7)
NEUTROPHILS NFR BLD: 66.6 %
PLATELET # BLD AUTO: 169 K/UL (ref 135–450)
PMV BLD AUTO: 9.1 FL (ref 5–10.5)
POTASSIUM SERPL-SCNC: 4.6 MMOL/L (ref 3.5–5.1)
PROT SERPL-MCNC: 6 G/DL (ref 6.4–8.2)
RBC # BLD AUTO: 4.37 M/UL (ref 4.2–5.9)
SODIUM SERPL-SCNC: 140 MMOL/L (ref 136–145)
TRIGL SERPL-MCNC: 76 MG/DL (ref 0–150)
VLDLC SERPL CALC-MCNC: 15 MG/DL
WBC # BLD AUTO: 4.2 K/UL (ref 4–11)

## 2025-09-05 PROCEDURE — 36415 COLL VENOUS BLD VENIPUNCTURE: CPT | Performed by: INTERNAL MEDICINE

## 2025-09-06 LAB
EST. AVERAGE GLUCOSE BLD GHB EST-MCNC: 116.9 MG/DL
HBA1C MFR BLD: 5.7 %

## (undated) DEVICE — SNARE ENDOSCP 11 MM BRAIDED WIRE CAPTFLX DISP

## (undated) DEVICE — ENDO CARRY-ON PROCEDURE KIT: Brand: ENDO CARRY-ON PROCEDURE KIT

## (undated) DEVICE — CONTAINER SPEC 480ML CLR POLYSTYR 10% NEUT BUFF FRMLN ZN

## (undated) DEVICE — ELECTRODE PT RET AD L9FT HI MOIST COND ADH HYDRGEL CORDED

## (undated) DEVICE — TRAP POLYP ETRAP

## (undated) DEVICE — CLIP LIG L235CM RESOL 360 BX/20